# Patient Record
Sex: MALE | Race: WHITE | NOT HISPANIC OR LATINO | Employment: OTHER | URBAN - METROPOLITAN AREA
[De-identification: names, ages, dates, MRNs, and addresses within clinical notes are randomized per-mention and may not be internally consistent; named-entity substitution may affect disease eponyms.]

---

## 2023-09-06 LAB
APPEARANCE, URINE: CLEAR
BILIRUBIN, URINE: NEGATIVE
BLOOD, URINE: NEGATIVE
COLOR, URINE: YELLOW
GLUCOSE, URINE: NEGATIVE MG/DL
KETONES, URINE: NEGATIVE MG/DL
LEUKOCYTE ESTERASE, URINE: NEGATIVE
NITRITE, URINE: NEGATIVE
PH, URINE: 5 (ref 5–8)
PROSTATE SPECIFIC AG (NG/ML) IN SER/PLAS: 0.2 NG/ML (ref 0–4)
PROTEIN, URINE: NEGATIVE MG/DL
SPECIFIC GRAVITY, URINE: 1.02 (ref 1–1.03)
UROBILINOGEN, URINE: <2 MG/DL (ref 0–1.9)

## 2023-09-07 LAB — URINE CULTURE: NORMAL

## 2024-01-08 ENCOUNTER — PROCEDURE VISIT (OUTPATIENT)
Dept: UROLOGY | Facility: CLINIC | Age: 64
End: 2024-01-08
Payer: MEDICARE

## 2024-01-08 VITALS
SYSTOLIC BLOOD PRESSURE: 135 MMHG | BODY MASS INDEX: 41.21 KG/M2 | DIASTOLIC BLOOD PRESSURE: 78 MMHG | WEIGHT: 271 LBS | RESPIRATION RATE: 16 BRPM

## 2024-01-08 DIAGNOSIS — N35.014 POST-TRAUMATIC MALE URETHRAL STRICTURE: ICD-10-CM

## 2024-01-08 DIAGNOSIS — R31.0 GROSS HEMATURIA: Primary | ICD-10-CM

## 2024-01-08 PROCEDURE — 52281 CYSTOSCOPY AND TREATMENT: CPT | Performed by: UROLOGY

## 2024-01-08 NOTE — PROGRESS NOTES
"Patient ID: Armando Smith is a 63 y.o. male.    Procedures  Pt took macrodantin 50mg po as prescribed  Anesthesia: Local 2% Lidocaine  Instruments: 6F flexible disposable cystoscope  Pt brought to procedure room and placed in supine lithotomy position. Pt draped and prepped in normal sterile fashion. 10ml lidocaine instilled into urethral meatus. Pt tolerated well    I was present as chaperone for the entirety of the procedure Lisa Dunaway  Cystoscopy performed by Dr. Mynor Anderson        Bedside \"Time Out\" Verification   Today's Date: 01/08/2024. I attest that this time out verification took place prior to the procedure.   Procedure: cysto/dil    RN/LPN/MA:OMEGA   Provider: WAL.   Verified By: RN/LPN/MA, ARMANDOJAYNE SMITH and Provider.   Prior to the start of the procedure a time out was taken and the following were verified: the identity of the patient using two patient identifiers, the correct procedure, the correct site marked as indicated, the correct positioning for the patient and the correct equipment was obtained.   Cystoscopy - male ARMANDO SMITH identified using two (2) forms of identification.   Procedure: diagnostic cystourethroscopy.  Procedure Note: Time Started: 10:30am. Time Completed: 10:57 AM    Indications for procedure: irritable voiding symptoms.   Discussed with patient: Risks, benefits, and alternative were discussed in detail. Patient appears to understand and agrees to proceed. Patient has signed the procedure consent form.    CYSTOSCOPY:    Cystoscopy today once again reveals 8 separate distal urethral strictures beginning 2 cm from the meatus with the last being 4 cm distal from the external sphincter.  All dilated to 24 Paraguayan with minimal discomfort and minimal bleeding.  Once inside the bladder, no evidence of stones, lesions or tumors.  "

## 2024-01-08 NOTE — PROGRESS NOTES
"  Provider Impressions     63 year-old white male with his wife Marj. Originally presented with URINARY INCONTINENCE, GROSS HEMATURIA, URETHRAL STRICTURE, and BIPOLAR DISORDER. He was experiencing 3 UTIs per year. He receives mandy-intercourse ciprofloxacin. No family history of prostate cancer. Never smoked cigarettes.     2008 at [facility], a Askew CATHETER was placed. The patient claims that that \"started my problem\". He is now undergoing cystoscopy with urethral dilatation on a routine schedule.     10/01/12, PSA 0.5.     11/12/13, PSA 0.4, CYSTOSCOPY with URETHRAL DILATATION to performed.     04/08/15 PSA 0.4, all urine testing negative. CYSTOSCOPY with URETHRAL DILATATION to 24 Latvian. Patient states that 9 months was too long and that he was DRIBBLING and had poor stream over the last month. We will therefore change his schedule to 8 months and keep it at that range.     12/08/15, very significant DISTAL URETHRAL STRICTURE approximately 6 cm from the meatus. Measured and calibrated at 6 Latvian. This was carefully dilated with bleeding. Urine studies were normal and PSA is 0.2. We will transformed to a new schedule. He will have dilations every 6 months, March and September. Office visit in September with UA, C&S, and PSA.     03/19/16, successful cystoscopy with URETHRAL DILATATION. Multiple, 8-10, STRICTURES in the distal URETHRA DILATED to 24 Latvian with bleeding. We will maintain a 6 month schedule.     10/05/16, successful CYSTOSCOPY with URETHRAL DILATATION of 9 separate distal URETHRAL STRICTURES. There was some bleeding and discomfort. Urinalysis and urine culture negative. PSA is 0.2. We will maintain a 6 month schedule.     03/08/17, successful CYSTOSCOPY with URETHRAL DILATATION of 6 separate distal URETHRAL STRICTURES. There was moderate discomfort. Patient had a recent UTI which was resistant to Cipro. Therefore we will switch his prophylactic antibiotic to Macrobid. He will return in 6 months.   "   09/12/17, successful CYSTOSCOPY with URETHRAL DILATATION of of 6 separate distal URETHRAL STRICTURES, beginning at the meatus, to the external sphincter. There was mild discomfort. We will continue on his mandy-intercourse Macrobid. He did not obtain laboratory testing and will return soon for that. He will maintain a 6 month dilation schedule.     10/10/17, patient returns for office visit only. PSA 0.3, urinalysis negative, urine culture mixed. His wife Marj is with him today. He will return on 03/13/18 for dilation.     03/13/18, successful CYSTOSCOPY with URETHRAL DILATATION of of 6 separate distal URETHRAL STRICTURES, beginning 2 cm from the meatus, to the external sphincter. There was mild discomfort. We will continue on his mandy-intercourse Macrobid. we will maintain a 6 month schedule. He will return in September and that will also be his yearly visit.     09/11/18, successful CYSTOSCOPY with URETHRAL DILATATION of 8 separate distal URETHRAL STRICTURES, beginning 2 cm from the meatus. These extend to the EXTERNAL SPHINCTER. They were extremely dense and very difficult to dilate today. There was moderate discomfort. We will transition to a 4 month schedule instead of 6. He will continue with mandy-intercourse Macrobid. Urinalysis and urine culture are normal. PSA is 1.86.     01/08/19, successful CYSTOSCOPY WITH URETHRAL DILATATION once again of 8 SEPARATE DISTAL URETHRAL STRICTURES beginning 2 cm from the meatus and ending 4 cm from the external sphincter. Minimal pain and moderate bleeding. I believe the 4 month schedule is at the threshold and we will maintain this regimen.     05/08/19, successful CYSTOSCOPY WITH URETHRAL DILATATION of 8 separate distal u caps that s beginning 2 cm from the meatus and ending 2 cm from the EXTERNAL SPHINCTER. Minimal pain and moderate bleeding. Patient states that the 4 month schedule is dramatically better, adding that he only notices minimal discomfort with dilation  and his stream is only mildly slower the last month. We will continue to maintain that regimen.     09/18/19, successful CYSTOSCOPY WITH URETHRAL DILATATION of 8 separate distal URETHRAL STRICTURES beginning 2 cm from the meatus and ending 2 cm from the external sphincter. Minimal pain and moderate bleeding. Patient states that the 4 month schedule is perfect. He is starting to notice decreased flow stream and the discomfort with dilatation is only minimal. We will maintain that regimen. Urinalysis shows no blood, urine culture no growth, and PSA 0.2.     01/08/20, successful CYSTOSCOPY WITH URETHRAL DILATATION OF 12 SEPARATE DISTAL URETHRAL STRICTURES beginning 2 cm from the meatus and ending 2 cm from the external sphincter to 24 Somali. Minimal pain and minimal bleeding. He is very happy with the 4 month schedule. He also was noticed that he has not had a UTI for several years. We will maintain this regimen.     05/22/20, successful CYSTOSCOPY WITH URETHRAL DILATATION of 12 separate DISTAL URETHRAL STRICTURES beginning 2 cm from the meatus and ending 2 cm from the external sphincter to 24 Somali with minimal pain. He is very pleased with this 4 month schedule.     09/25/20, successful CYSTOSCOPY WITH URETHRAL DILATATION of 12 distinct DISTAL URETHRAL STRICTURES beginning at 2 cm from the meatus and ending 2 cm from the external sphincter, to 24 Somali with minimal pain and no bleeding. Urinalysis and urine culture are negative. PSA is 0.20. We will maintain a 4 month routine.     March 1, 2021, successful CYSTOSCOPY WITH URETHRAL DILATATION OF 10 DISTINCT DISTAL URETHRAL STRICTURES beginning 2 cm proximal to the meatus and ending 2 cm distal from the external sphincter. These were dilated to 24 Somali with moderate pain and no bleeding. Patient is late for his routine dilation and the strictures were quite dense. We will have him return in May and then resume his 4-month schedule.     May 8, 2021, successful  cystoscopy with urethral dilatation of 10 separate distal urethral strictures. The first beginning 2 cm proximal to the meatus and the last being 2 cm distal from the external sphincter. They were dilated to 24 Maldivian with minimal pain and minimal bleeding. He will return in September and maintain a 4-month regimen.  He is not interested in definitive surgical repair.     September 3, 2021, successful cystoscopy with dilatation of 10 separate distal urethral strictures. The first is encountered 2 cm from the meatus and the last being 2 cm distal from the external sphincter. All dilated to 24 Maldivian with minimal bleeding. Patient will return in January and maintain a 4-month regimen. Once again we discussed a definitive surgical repair which he refuses. Urinalysis is negative, urine culture no growth, PSA 0.20.     January 7, 2022, successful cystoscopy with dilatation of 10 separate distal urethral strictures beginning 2 cm from the meatus and ending 2 cm distal to the external sphincter. All dilated to 24 Maldivian with moderate pain and minimal bleeding. Patient states that over the last 2 weeks he has been noticing a decreased flow of stream. He will return in May and we will maintain his 4-month schedule.     May 4, 2022, successful cystoscopy with dilatation of 9 separate distal urethral strictures beginning 2 cm from the meatus and ending 3 cm distal to the external sphincter. They were all dilated to 24 Maldivian with minimal pain and minimal bleeding. He has been straining to urinate over the last week. We will maintain his 4-month schedule and see him again in September September 12, 2022, cystoscopy with dilatation of 9 separate distal urethral strictures beginning 4 cm from the meatus and ending 2 cm from the external sphincter. Quite dense today. Dilated to 24 Maldivian. Minimal pain and moderate bleeding. The bladder itself appears normal. Urine analysis was negative for blood. Urine culture no growth. PSA  0.21. Patient believed by symptoms that he had a UTI, no culture given. He self treated with on hand Macrobid with good results. We will maintain his 4-month schedule.     January 3, 2023, cystoscopy with dilatation of 8 separate distal urethral strictures beginning 2 cm from the meatus and ending 4 cm from the external sphincter. These were dilated to 24 Thai with minimal bleeding and minimal discomfort. Moderately obstructed prostatic urethra with a trabeculated bladder. We will maintain his 4-month schedule.     April 28, 2023, cystoscopy with dilatation of 8 separate distal urethral strictures beginning 2 cm from the meatus with the last stricture being 4 cm from the external sphincter. Dilated to 24 Thai with moderate discomfort and no bleeding. Moderately obstructed prostatic urethra and a trabeculated bladder with debris. We will maintain the 4-month schedule.     September 11, 2023, cystoscopy with dilatation of 8 separate distal urethral strictures. These begin 2 cm from the meatus and and 4 cm from the external sphincter. Dilation to 24 Thai with minimal discomfort and minimal bleeding. Moderately obstructed prostatic urethra. Trabeculated bladder with minimal debris. He wishes to maintain his 4-month schedule. Urinalysis is negative for blood. Urine culture no growth. PSA 0.20.    January 8, 2024, cystoscopy with dilatation of 8 separate distal urethral strictures. These begin 2 cm from the meatus and end 4 cm from the external sphincter. Dilation to 24 Thai with minimal discomfort and minimal bleeding. Moderately obstructed prostatic urethra. Trabeculated bladder with minimal debris. He wishes to maintain his 4-month schedule.     PLAN:     #1 the patient will return in January, May and September for cystoscopy with urethral dilatation to 24 Thai. MACROBID 100 mg PROPHYLAXIS, Begin 3 days PRIOR. We will maintain a 4 month schedule,     #2 UA, C&S, PSA and office visit in September only.  MAINTAIN a 4 month schedule.     #3 Macrobid 100 mg by mouth every 12 hours mandy-intercourse when necessary.    Physical Exam  Constitutional:       Appearance: Normal appearance.   HENT:      Head: Normocephalic and atraumatic.   Pulmonary:      Effort: Pulmonary effort is normal.   Abdominal:      Palpations: Abdomen is soft.      Tenderness: There is no abdominal tenderness.   Genitourinary:     Penis: Normal.       Testes: Normal.   Musculoskeletal:         General: Normal range of motion.      Cervical back: Normal range of motion and neck supple.   Neurological:      General: No focal deficit present.      Mental Status: He is alert and oriented to person, place, and time.   Psychiatric:         Mood and Affect: Mood normal.         Behavior: Behavior normal.         This note was created with voice-recognition software and was not corrected for typographical or grammatical errors

## 2024-01-08 NOTE — LETTER
"January 8, 2024     Telly Marques [place]  [id number] [address] [facility] [address]  [facility] [address]    Patient: Armando Smith   YOB: 1960   Date of Visit: 1/8/2024       Dear Dr. Telly Marques MD:    Thank you for referring Armando Smith to me for evaluation. Below are my notes for this consultation.  If you have questions, please do not hesitate to call me. I look forward to following your patient along with you.       Sincerely,     Mynor Anderson MD      CC: No Recipients  ______________________________________________________________________________________      Provider Impressions     63 year-old white male with his wife Marj. Originally presented with URINARY INCONTINENCE, GROSS HEMATURIA, URETHRAL STRICTURE, and BIPOLAR DISORDER. He was experiencing 3 UTIs per year. He receives mandy-intercourse ciprofloxacin. No family history of prostate cancer. Never smoked cigarettes.     2008 at [facility], a Askew CATHETER was placed. The patient claims that that \"started my problem\". He is now undergoing cystoscopy with urethral dilatation on a routine schedule.     10/01/12, PSA 0.5.     11/12/13, PSA 0.4, CYSTOSCOPY with URETHRAL DILATATION to performed.     04/08/15 PSA 0.4, all urine testing negative. CYSTOSCOPY with URETHRAL DILATATION to 24 Iraqi. Patient states that 9 months was too long and that he was DRIBBLING and had poor stream over the last month. We will therefore change his schedule to 8 months and keep it at that range.     12/08/15, very significant DISTAL URETHRAL STRICTURE approximately 6 cm from the meatus. Measured and calibrated at 6 Iraqi. This was carefully dilated with bleeding. Urine studies were normal and PSA is 0.2. We will transformed to a new schedule. He will have dilations every 6 months, March and September. Office visit in September with UA, C&S, and PSA.     03/19/16, successful cystoscopy with URETHRAL DILATATION. Multiple, 8-10, STRICTURES in the distal " URETHRA DILATED to 24 Faroese with bleeding. We will maintain a 6 month schedule.     10/05/16, successful CYSTOSCOPY with URETHRAL DILATATION of 9 separate distal URETHRAL STRICTURES. There was some bleeding and discomfort. Urinalysis and urine culture negative. PSA is 0.2. We will maintain a 6 month schedule.     03/08/17, successful CYSTOSCOPY with URETHRAL DILATATION of 6 separate distal URETHRAL STRICTURES. There was moderate discomfort. Patient had a recent UTI which was resistant to Cipro. Therefore we will switch his prophylactic antibiotic to Macrobid. He will return in 6 months.     09/12/17, successful CYSTOSCOPY with URETHRAL DILATATION of of 6 separate distal URETHRAL STRICTURES, beginning at the meatus, to the external sphincter. There was mild discomfort. We will continue on his mandy-intercourse Macrobid. He did not obtain laboratory testing and will return soon for that. He will maintain a 6 month dilation schedule.     10/10/17, patient returns for office visit only. PSA 0.3, urinalysis negative, urine culture mixed. His wife Marj is with him today. He will return on 03/13/18 for dilation.     03/13/18, successful CYSTOSCOPY with URETHRAL DILATATION of of 6 separate distal URETHRAL STRICTURES, beginning 2 cm from the meatus, to the external sphincter. There was mild discomfort. We will continue on his mandy-intercourse Macrobid. we will maintain a 6 month schedule. He will return in September and that will also be his yearly visit.     09/11/18, successful CYSTOSCOPY with URETHRAL DILATATION of 8 separate distal URETHRAL STRICTURES, beginning 2 cm from the meatus. These extend to the EXTERNAL SPHINCTER. They were extremely dense and very difficult to dilate today. There was moderate discomfort. We will transition to a 4 month schedule instead of 6. He will continue with mandy-intercourse Macrobid. Urinalysis and urine culture are normal. PSA is 1.86.     01/08/19, successful CYSTOSCOPY WITH URETHRAL  DILATATION once again of 8 SEPARATE DISTAL URETHRAL STRICTURES beginning 2 cm from the meatus and ending 4 cm from the external sphincter. Minimal pain and moderate bleeding. I believe the 4 month schedule is at the threshold and we will maintain this regimen.     05/08/19, successful CYSTOSCOPY WITH URETHRAL DILATATION of 8 separate distal u caps that s beginning 2 cm from the meatus and ending 2 cm from the EXTERNAL SPHINCTER. Minimal pain and moderate bleeding. Patient states that the 4 month schedule is dramatically better, adding that he only notices minimal discomfort with dilation and his stream is only mildly slower the last month. We will continue to maintain that regimen.     09/18/19, successful CYSTOSCOPY WITH URETHRAL DILATATION of 8 separate distal URETHRAL STRICTURES beginning 2 cm from the meatus and ending 2 cm from the external sphincter. Minimal pain and moderate bleeding. Patient states that the 4 month schedule is perfect. He is starting to notice decreased flow stream and the discomfort with dilatation is only minimal. We will maintain that regimen. Urinalysis shows no blood, urine culture no growth, and PSA 0.2.     01/08/20, successful CYSTOSCOPY WITH URETHRAL DILATATION OF 12 SEPARATE DISTAL URETHRAL STRICTURES beginning 2 cm from the meatus and ending 2 cm from the external sphincter to 24 Georgian. Minimal pain and minimal bleeding. He is very happy with the 4 month schedule. He also was noticed that he has not had a UTI for several years. We will maintain this regimen.     05/22/20, successful CYSTOSCOPY WITH URETHRAL DILATATION of 12 separate DISTAL URETHRAL STRICTURES beginning 2 cm from the meatus and ending 2 cm from the external sphincter to 24 Georgian with minimal pain. He is very pleased with this 4 month schedule.     09/25/20, successful CYSTOSCOPY WITH URETHRAL DILATATION of 12 distinct DISTAL URETHRAL STRICTURES beginning at 2 cm from the meatus and ending 2 cm from the external  sphincter, to 24 Moroccan with minimal pain and no bleeding. Urinalysis and urine culture are negative. PSA is 0.20. We will maintain a 4 month routine.     March 1, 2021, successful CYSTOSCOPY WITH URETHRAL DILATATION OF 10 DISTINCT DISTAL URETHRAL STRICTURES beginning 2 cm proximal to the meatus and ending 2 cm distal from the external sphincter. These were dilated to 24 Moroccan with moderate pain and no bleeding. Patient is late for his routine dilation and the strictures were quite dense. We will have him return in May and then resume his 4-month schedule.     May 8, 2021, successful cystoscopy with urethral dilatation of 10 separate distal urethral strictures. The first beginning 2 cm proximal to the meatus and the last being 2 cm distal from the external sphincter. They were dilated to 24 Moroccan with minimal pain and minimal bleeding. He will return in September and maintain a 4-month regimen.  He is not interested in definitive surgical repair.     September 3, 2021, successful cystoscopy with dilatation of 10 separate distal urethral strictures. The first is encountered 2 cm from the meatus and the last being 2 cm distal from the external sphincter. All dilated to 24 Moroccan with minimal bleeding. Patient will return in January and maintain a 4-month regimen. Once again we discussed a definitive surgical repair which he refuses. Urinalysis is negative, urine culture no growth, PSA 0.20.     January 7, 2022, successful cystoscopy with dilatation of 10 separate distal urethral strictures beginning 2 cm from the meatus and ending 2 cm distal to the external sphincter. All dilated to 24 Moroccan with moderate pain and minimal bleeding. Patient states that over the last 2 weeks he has been noticing a decreased flow of stream. He will return in May and we will maintain his 4-month schedule.     May 4, 2022, successful cystoscopy with dilatation of 9 separate distal urethral strictures beginning 2 cm from the meatus and  ending 3 cm distal to the external sphincter. They were all dilated to 24 Sierra Leonean with minimal pain and minimal bleeding. He has been straining to urinate over the last week. We will maintain his 4-month schedule and see him again in September September 12, 2022, cystoscopy with dilatation of 9 separate distal urethral strictures beginning 4 cm from the meatus and ending 2 cm from the external sphincter. Quite dense today. Dilated to 24 Sierra Leonean. Minimal pain and moderate bleeding. The bladder itself appears normal. Urine analysis was negative for blood. Urine culture no growth. PSA 0.21. Patient believed by symptoms that he had a UTI, no culture given. He self treated with on hand Macrobid with good results. We will maintain his 4-month schedule.     January 3, 2023, cystoscopy with dilatation of 8 separate distal urethral strictures beginning 2 cm from the meatus and ending 4 cm from the external sphincter. These were dilated to 24 Sierra Leonean with minimal bleeding and minimal discomfort. Moderately obstructed prostatic urethra with a trabeculated bladder. We will maintain his 4-month schedule.     April 28, 2023, cystoscopy with dilatation of 8 separate distal urethral strictures beginning 2 cm from the meatus with the last stricture being 4 cm from the external sphincter. Dilated to 24 Sierra Leonean with moderate discomfort and no bleeding. Moderately obstructed prostatic urethra and a trabeculated bladder with debris. We will maintain the 4-month schedule.     September 11, 2023, cystoscopy with dilatation of 8 separate distal urethral strictures. These begin 2 cm from the meatus and and 4 cm from the external sphincter. Dilation to 24 Sierra Leonean with minimal discomfort and minimal bleeding. Moderately obstructed prostatic urethra. Trabeculated bladder with minimal debris. He wishes to maintain his 4-month schedule. Urinalysis is negative for blood. Urine culture no growth. PSA 0.20.    January 8, 2024, cystoscopy with  dilatation of 8 separate distal urethral strictures. These begin 2 cm from the meatus and end 4 cm from the external sphincter. Dilation to 24 Samoan with minimal discomfort and minimal bleeding. Moderately obstructed prostatic urethra. Trabeculated bladder with minimal debris. He wishes to maintain his 4-month schedule.     PLAN:     #1 the patient will return in January, May and September for cystoscopy with urethral dilatation to 24 Samoan. MACROBID 100 mg PROPHYLAXIS, Begin 3 days PRIOR. We will maintain a 4 month schedule,     #2 UA, C&S, PSA and office visit in September only. MAINTAIN a 4 month schedule.     #3 Macrobid 100 mg by mouth every 12 hours mandy-intercourse when necessary.    Physical Exam  Constitutional:       Appearance: Normal appearance.   HENT:      Head: Normocephalic and atraumatic.   Pulmonary:      Effort: Pulmonary effort is normal.   Abdominal:      Palpations: Abdomen is soft.      Tenderness: There is no abdominal tenderness.   Genitourinary:     Penis: Normal.       Testes: Normal.   Musculoskeletal:         General: Normal range of motion.      Cervical back: Normal range of motion and neck supple.   Neurological:      General: No focal deficit present.      Mental Status: He is alert and oriented to person, place, and time.   Psychiatric:         Mood and Affect: Mood normal.         Behavior: Behavior normal.         This note was created with voice-recognition software and was not corrected for typographical or grammatical errors

## 2024-01-08 NOTE — PATIENT INSTRUCTIONS
Patient Discussion/Summary     Good to see you once again. You had a successful cystoscopy with urethral dilatation of 8 strictures. The strictures were quite dense today and you wish to maintain a 4 month schedule.       This note was created with voice-recognition software and was not corrected for typographical or grammatical errors.

## 2024-05-15 RX ORDER — NITROFURANTOIN 25; 75 MG/1; MG/1
100 CAPSULE ORAL EVERY 12 HOURS
Qty: 10 CAPSULE | Refills: 0 | Status: SHIPPED | OUTPATIENT
Start: 2024-05-15 | End: 2024-05-20

## 2024-05-20 ENCOUNTER — PROCEDURE VISIT (OUTPATIENT)
Dept: UROLOGY | Facility: CLINIC | Age: 64
End: 2024-05-20
Payer: MEDICARE

## 2024-05-20 VITALS
SYSTOLIC BLOOD PRESSURE: 155 MMHG | RESPIRATION RATE: 16 BRPM | BODY MASS INDEX: 40.46 KG/M2 | HEART RATE: 60 BPM | DIASTOLIC BLOOD PRESSURE: 74 MMHG | WEIGHT: 266.1 LBS

## 2024-05-20 DIAGNOSIS — R31.0 GROSS HEMATURIA: ICD-10-CM

## 2024-05-20 DIAGNOSIS — N35.014 POST-TRAUMATIC MALE URETHRAL STRICTURE: ICD-10-CM

## 2024-05-20 DIAGNOSIS — N39.0 URINARY TRACT INFECTION WITHOUT HEMATURIA, SITE UNSPECIFIED: ICD-10-CM

## 2024-05-20 DIAGNOSIS — N35.014 POST-TRAUMATIC MALE URETHRAL STRICTURE: Primary | ICD-10-CM

## 2024-05-20 PROCEDURE — 52281 CYSTOSCOPY AND TREATMENT: CPT | Performed by: UROLOGY

## 2024-05-20 NOTE — PROGRESS NOTES
"  Provider Impressions     63 year-old white male with his wife Marj. Originally presented with URINARY INCONTINENCE, GROSS HEMATURIA, URETHRAL STRICTURE, and BIPOLAR DISORDER. He was experiencing 3 UTIs per year. He receives mandy-intercourse ciprofloxacin. No family history of prostate cancer. Never smoked cigarettes.     2008 at [facility], a [place]kew CATHETER was placed. The patient claims that that \"started my problem\". He is now undergoing cystoscopy with urethral dilatation on a routine schedule.     10/01/12, PSA 0.5.     11/12/13, PSA 0.4, cystoscopy with urethral dilatation performed.     04/08/15 PSA 0.4, all urine testing negative. CYSTOSCOPY with URETHRAL DILATATION to 24 Belarusian. Patient states that 9 months was too long and that he was DRIBBLING and had poor stream over the last month. We will therefore change his schedule to 8 months and keep it at that range.     12/08/15, very significant DISTAL URETHRAL STRICTURE approximately 6 cm from the meatus. Measured and calibrated at 6 Belarusian. This was carefully dilated with bleeding. Urine studies were normal and PSA is 0.2. We will transformed to a new schedule. He will have dilations every 6 months, March and September. Office visit in September with UA, C&S, and PSA.     03/19/16, successful cystoscopy with URETHRAL DILATATION. Multiple, 8-10, STRICTURES in the distal URETHRA DILATED to 24 Belarusian with bleeding. We will maintain a 6 month schedule.     10/05/16, successful CYSTOSCOPY with URETHRAL DILATATION of 9 separate distal URETHRAL STRICTURES. There was some bleeding and discomfort. Urinalysis and urine culture negative. PSA is 0.2. We will maintain a 6 month schedule.     03/08/17, successful CYSTOSCOPY with URETHRAL DILATATION of 6 separate distal URETHRAL STRICTURES. There was moderate discomfort. Patient had a recent UTI which was resistant to Cipro. Therefore we will switch his prophylactic antibiotic to Macrobid. He will return in 6 months.   "   09/12/17, successful CYSTOSCOPY with URETHRAL DILATATION of of 6 separate distal URETHRAL STRICTURES, beginning at the meatus, to the external sphincter. There was mild discomfort. We will continue on his mandy-intercourse Macrobid. He did not obtain laboratory testing and will return soon for that. He will maintain a 6 month dilation schedule.     10/10/17, patient returns for office visit only. PSA 0.3, urinalysis negative, urine culture mixed. His wife Marj is with him today. He will return on 03/13/18 for dilation.     03/13/18, successful CYSTOSCOPY with URETHRAL DILATATION of of 6 separate distal URETHRAL STRICTURES, beginning 2 cm from the meatus, to the external sphincter. There was mild discomfort. We will continue on his mandy-intercourse Macrobid. we will maintain a 6 month schedule. He will return in September and that will also be his yearly visit.     09/11/18, successful CYSTOSCOPY with URETHRAL DILATATION of 8 separate distal URETHRAL STRICTURES, beginning 2 cm from the meatus. These extend to the EXTERNAL SPHINCTER. They were extremely dense and very difficult to dilate today. There was moderate discomfort. We will transition to a 4 month schedule instead of 6. He will continue with mandy-intercourse Macrobid. Urinalysis and urine culture are normal. PSA is 1.86.     01/08/19, successful CYSTOSCOPY WITH URETHRAL DILATATION once again of 8 SEPARATE DISTAL URETHRAL STRICTURES beginning 2 cm from the meatus and ending 4 cm from the external sphincter. Minimal pain and moderate bleeding. I believe the 4 month schedule is at the threshold and we will maintain this regimen.     05/08/19, successful CYSTOSCOPY WITH URETHRAL DILATATION of 8 separate distal u caps that s beginning 2 cm from the meatus and ending 2 cm from the EXTERNAL SPHINCTER. Minimal pain and moderate bleeding. Patient states that the 4 month schedule is dramatically better, adding that he only notices minimal discomfort with dilation  and his stream is only mildly slower the last month. We will continue to maintain that regimen.     09/18/19, successful CYSTOSCOPY WITH URETHRAL DILATATION of 8 separate distal URETHRAL STRICTURES beginning 2 cm from the meatus and ending 2 cm from the external sphincter. Minimal pain and moderate bleeding. Patient states that the 4 month schedule is perfect. He is starting to notice decreased flow stream and the discomfort with dilatation is only minimal. We will maintain that regimen. Urinalysis shows no blood, urine culture no growth, and PSA 0.2.     01/08/20, successful CYSTOSCOPY WITH URETHRAL DILATATION OF 12 SEPARATE DISTAL URETHRAL STRICTURES beginning 2 cm from the meatus and ending 2 cm from the external sphincter to 24 Cypriot. Minimal pain and minimal bleeding. He is very happy with the 4 month schedule. He also was noticed that he has not had a UTI for several years. We will maintain this regimen.     05/22/20, successful CYSTOSCOPY WITH URETHRAL DILATATION of 12 separate DISTAL URETHRAL STRICTURES beginning 2 cm from the meatus and ending 2 cm from the external sphincter to 24 Cypriot with minimal pain. He is very pleased with this 4 month schedule.     09/25/20, successful CYSTOSCOPY WITH URETHRAL DILATATION of 12 distinct DISTAL URETHRAL STRICTURES beginning at 2 cm from the meatus and ending 2 cm from the external sphincter, to 24 Cypriot with minimal pain and no bleeding. Urinalysis and urine culture are negative. PSA is 0.20. We will maintain a 4 month routine.     March 1, 2021, successful CYSTOSCOPY WITH URETHRAL DILATATION OF 10 DISTINCT DISTAL URETHRAL STRICTURES beginning 2 cm proximal to the meatus and ending 2 cm distal from the external sphincter. These were dilated to 24 Cypriot with moderate pain and no bleeding. Patient is late for his routine dilation and the strictures were quite dense. We will have him return in May and then resume his 4-month schedule.     May 8, 2021, successful  cystoscopy with urethral dilatation of 10 separate distal urethral strictures. The first beginning 2 cm proximal to the meatus and the last being 2 cm distal from the external sphincter. They were dilated to 24 Albanian with minimal pain and minimal bleeding. He will return in September and maintain a 4-month regimen.  He is not interested in definitive surgical repair.     September 3, 2021, successful cystoscopy with dilatation of 10 separate distal urethral strictures. The first is encountered 2 cm from the meatus and the last being 2 cm distal from the external sphincter. All dilated to 24 Albanian with minimal bleeding. Patient will return in January and maintain a 4-month regimen. Once again we discussed a definitive surgical repair which he refuses. Urinalysis is negative, urine culture no growth, PSA 0.20.     January 7, 2022, successful cystoscopy with dilatation of 10 separate distal urethral strictures beginning 2 cm from the meatus and ending 2 cm distal to the external sphincter. All dilated to 24 Albanian with moderate pain and minimal bleeding. Patient states that over the last 2 weeks he has been noticing a decreased flow of stream. He will return in May and we will maintain his 4-month schedule.     May 4, 2022, successful cystoscopy with dilatation of 9 separate distal urethral strictures beginning 2 cm from the meatus and ending 3 cm distal to the external sphincter. They were all dilated to 24 Albanian with minimal pain and minimal bleeding. He has been straining to urinate over the last week. We will maintain his 4-month schedule and see him again in September September 12, 2022, cystoscopy with dilatation of 9 separate distal urethral strictures beginning 4 cm from the meatus and ending 2 cm from the external sphincter. Quite dense today. Dilated to 24 Albanian. Minimal pain and moderate bleeding. The bladder itself appears normal. Urine analysis was negative for blood. Urine culture no growth. PSA  0.21. Patient believed by symptoms that he had a UTI, no culture given. He self treated with on hand Macrobid with good results. We will maintain his 4-month schedule.     January 3, 2023, cystoscopy with dilatation of 8 separate distal urethral strictures beginning 2 cm from the meatus and ending 4 cm from the external sphincter. These were dilated to 24 Emirati with minimal bleeding and minimal discomfort. Moderately obstructed prostatic urethra with a trabeculated bladder. We will maintain his 4-month schedule.     April 28, 2023, cystoscopy with dilatation of 8 separate distal urethral strictures beginning 2 cm from the meatus with the last stricture being 4 cm from the external sphincter. Dilated to 24 Emirati with moderate discomfort and no bleeding. Moderately obstructed prostatic urethra and a trabeculated bladder with debris. We will maintain the 4-month schedule.     September 11, 2023, cystoscopy with dilatation of 8 separate distal urethral strictures. These begin 2 cm from the meatus and and 4 cm from the external sphincter. Dilation to 24 Emirati with minimal discomfort and minimal bleeding. Moderately obstructed prostatic urethra. Trabeculated bladder with minimal debris. He wishes to maintain his 4-month schedule. Urinalysis is negative for blood. Urine culture no growth. PSA 0.20.     January 8, 2024, cystoscopy with dilatation of 8 separate distal urethral strictures. These begin 2 cm from the meatus and end 4 cm from the external sphincter. Dilation to 24 Emirati with minimal discomfort and minimal bleeding. Moderately obstructed prostatic urethra. Trabeculated bladder with minimal debris. He wishes to maintain his 4-month schedule.    May 20, 2024, cystoscopy with dilatation of 8 separate, distal urethral strictures.  The first is 2 cm from the meatus and the last which was quite dense was 4 cm from the external sphincter.  All dilated to 24 Emirati with minimal bleeding and minimal discomfort.   Moderately obstructed prostatic urethra.  He will maintain his 4-month schedule.     PLAN:     #1 the patient will return in January, May and September for cystoscopy with urethral dilatation to 24 Maltese. MACROBID 100 mg PROPHYLAXIS, Begin 3 days PRIOR. We will maintain a 4 month schedule,     #2 UA, C&S, PSA and office visit in September only. MAINTAIN a 4 month schedule.     #3 Macrobid 100 mg by mouth every 12 hours mandy-intercourse when necessary.     Physical Exam  Constitutional:       Appearance: Normal appearance.   HENT:      Head: Normocephalic and atraumatic.   Pulmonary:      Effort: Pulmonary effort is normal.   Abdominal:      Palpations: Abdomen is soft.      Tenderness: There is no abdominal tenderness.   Genitourinary:     Penis: Normal.       Testes: Normal.   Musculoskeletal:         General: Normal range of motion.      Cervical back: Normal range of motion and neck supple.   Neurological:      General: No focal deficit present.      Mental Status: He is alert and oriented to person, place, and time.   Psychiatric:         Mood and Affect: Mood normal.         Behavior: Behavior normal.        This note was created with voice-recognition software and was not corrected for typographical or grammatical errors

## 2024-05-20 NOTE — PROGRESS NOTES
"Patient ID: Armando Smith is a 63 y.o. male.    Procedures  Pt took macrodantin 50mg po as prescribed  Anesthesia: Local 2% Lidocaine  Instruments: 6F flexible disposable cystoscope  Pt brought to procedure room and placed in supine lithotomy position. Pt draped and prepped in normal sterile fashion. 10ml lidocaine instilled into urethral meatus. Pt tolerated well    I was present as chaperone for the entirety of the procedure   Lisa Dunaway  Cystoscopy performed by Dr. Mynor Anderson  Bedside \"Time Out\" Verification   Today's Date: 5/20/2024 . I attest that this time out verification took place prior to the procedure.   Procedure: cysto/dil   RN/LPN/MA:OMEGA   Provider: [facility].   Verified By: RN/LPN/MA,  OMEGA  and Provider.   Prior to the start of the procedure a time out was taken and the following were verified: the identity of the patient using two patient identifiers, the correct procedure, the correct site marked as indicated, the correct positioning for the patient and the correct equipment was obtained.   Cystoscopy - male  ARMANDO SMITH      identified using two (2) forms of identification.   Procedure: diagnostic cystourethroscopy.  Procedure Note: Time Started: 10:30AM  Time Completed: 10:45 AM  Indications for procedure: irritable voiding symptoms.   Discussed with patient: Risks, benefits, and alternative were discussed in detail. Patient appears to understand and agrees to proceed. Patient has signed the procedure consent form.    CYSTOSCOPY:    Cystoscopy today reveals 8 separate dense urethral strictures.  The first 2 cm from the meatus and the last 4 cm from the external sphincter.  Moderately obstructed prostatic urethra.  These were dilated to 24 Citizen of [place] with minimal discomfort.    "

## 2024-05-20 NOTE — LETTER
"May 20, 2024     Telly Marques MD  [address] [facility] [address]  [facility] [address]    Patient: Armando Smith   YOB: 1960   Date of Visit: 5/20/2024       Dear Dr. Telly Marques MD:    Thank you for referring Armando Smith to me for evaluation. Below are my notes for this consultation.  If you have questions, please do not hesitate to call me. I look forward to following your patient along with you.       Sincerely,     Mynor Anderson MD      CC: No Recipients  ______________________________________________________________________________________      Provider Impressions     63 year-old white male with his wife Marj. Originally presented with URINARY INCONTINENCE, GROSS HEMATURIA, URETHRAL STRICTURE, and BIPOLAR DISORDER. He was experiencing 3 UTIs per year. He receives mandy-intercourse ciprofloxacin. No family history of prostate cancer. Never smoked cigarettes.     2008 at [facility], a Askew CATHETER was placed. The patient claims that that \"started my problem\". He is now undergoing cystoscopy with urethral dilatation on a routine schedule.     10/01/12, PSA 0.5.     11/12/13, PSA 0.4, cystoscopy with urethral dilatation performed.     04/08/15 PSA 0.4, all urine testing negative. CYSTOSCOPY with URETHRAL DILATATION to 24 Tanzanian. Patient states that 9 months was too long and that he was DRIBBLING and had poor stream over the last month. We will therefore change his schedule to 8 months and keep it at that range.     12/08/15, very significant DISTAL URETHRAL STRICTURE approximately 6 cm from the meatus. Measured and calibrated at 6 Tanzanian. This was carefully dilated with bleeding. Urine studies were normal and PSA is 0.2. We will transformed to a new schedule. He will have dilations every 6 months, March and September. Office visit in September with UA, C&S, and PSA.     03/19/16, successful cystoscopy with URETHRAL DILATATION. Multiple, 8-10, STRICTURES in the distal URETHRA " DILATED to 24 Grenadian with bleeding. We will maintain a 6 month schedule.     10/05/16, successful CYSTOSCOPY with URETHRAL DILATATION of 9 separate distal URETHRAL STRICTURES. There was some bleeding and discomfort. Urinalysis and urine culture negative. PSA is 0.2. We will maintain a 6 month schedule.     03/08/17, successful CYSTOSCOPY with URETHRAL DILATATION of 6 separate distal URETHRAL STRICTURES. There was moderate discomfort. Patient had a recent UTI which was resistant to Cipro. Therefore we will switch his prophylactic antibiotic to Macrobid. He will return in 6 months.     09/12/17, successful CYSTOSCOPY with URETHRAL DILATATION of of 6 separate distal URETHRAL STRICTURES, beginning at the meatus, to the external sphincter. There was mild discomfort. We will continue on his mandy-intercourse Macrobid. He did not obtain laboratory testing and will return soon for that. He will maintain a 6 month dilation schedule.     10/10/17, patient returns for office visit only. PSA 0.3, urinalysis negative, urine culture mixed. His wife Marj is with him today. He will return on 03/13/18 for dilation.     03/13/18, successful CYSTOSCOPY with URETHRAL DILATATION of of 6 separate distal URETHRAL STRICTURES, beginning 2 cm from the meatus, to the external sphincter. There was mild discomfort. We will continue on his mandy-intercourse Macrobid. we will maintain a 6 month schedule. He will return in September and that will also be his yearly visit.     09/11/18, successful CYSTOSCOPY with URETHRAL DILATATION of 8 separate distal URETHRAL STRICTURES, beginning 2 cm from the meatus. These extend to the EXTERNAL SPHINCTER. They were extremely dense and very difficult to dilate today. There was moderate discomfort. We will transition to a 4 month schedule instead of 6. He will continue with mandy-intercourse Macrobid. Urinalysis and urine culture are normal. PSA is 1.86.     01/08/19, successful CYSTOSCOPY WITH URETHRAL  DILATATION once again of 8 SEPARATE DISTAL URETHRAL STRICTURES beginning 2 cm from the meatus and ending 4 cm from the external sphincter. Minimal pain and moderate bleeding. I believe the 4 month schedule is at the threshold and we will maintain this regimen.     05/08/19, successful CYSTOSCOPY WITH URETHRAL DILATATION of 8 separate distal u caps that s beginning 2 cm from the meatus and ending 2 cm from the EXTERNAL SPHINCTER. Minimal pain and moderate bleeding. Patient states that the 4 month schedule is dramatically better, adding that he only notices minimal discomfort with dilation and his stream is only mildly slower the last month. We will continue to maintain that regimen.     09/18/19, successful CYSTOSCOPY WITH URETHRAL DILATATION of 8 separate distal URETHRAL STRICTURES beginning 2 cm from the meatus and ending 2 cm from the external sphincter. Minimal pain and moderate bleeding. Patient states that the 4 month schedule is perfect. He is starting to notice decreased flow stream and the discomfort with dilatation is only minimal. We will maintain that regimen. Urinalysis shows no blood, urine culture no growth, and PSA 0.2.     01/08/20, successful CYSTOSCOPY WITH URETHRAL DILATATION OF 12 SEPARATE DISTAL URETHRAL STRICTURES beginning 2 cm from the meatus and ending 2 cm from the external sphincter to 24 North Korean. Minimal pain and minimal bleeding. He is very happy with the 4 month schedule. He also was noticed that he has not had a UTI for several years. We will maintain this regimen.     05/22/20, successful CYSTOSCOPY WITH URETHRAL DILATATION of 12 separate DISTAL URETHRAL STRICTURES beginning 2 cm from the meatus and ending 2 cm from the external sphincter to 24 North Korean with minimal pain. He is very pleased with this 4 month schedule.     09/25/20, successful CYSTOSCOPY WITH URETHRAL DILATATION of 12 distinct DISTAL URETHRAL STRICTURES beginning at 2 cm from the meatus and ending 2 cm from the external  sphincter, to 24 Mongolian with minimal pain and no bleeding. Urinalysis and urine culture are negative. PSA is 0.20. We will maintain a 4 month routine.     March 1, 2021, successful CYSTOSCOPY WITH URETHRAL DILATATION OF 10 DISTINCT DISTAL URETHRAL STRICTURES beginning 2 cm proximal to the meatus and ending 2 cm distal from the external sphincter. These were dilated to 24 Mongolian with moderate pain and no bleeding. Patient is late for his routine dilation and the strictures were quite dense. We will have him return in May and then resume his 4-month schedule.     May 8, 2021, successful cystoscopy with urethral dilatation of 10 separate distal urethral strictures. The first beginning 2 cm proximal to the meatus and the last being 2 cm distal from the external sphincter. They were dilated to 24 Mongolian with minimal pain and minimal bleeding. He will return in September and maintain a 4-month regimen.  He is not interested in definitive surgical repair.     September 3, 2021, successful cystoscopy with dilatation of 10 separate distal urethral strictures. The first is encountered 2 cm from the meatus and the last being 2 cm distal from the external sphincter. All dilated to 24 Mongolian with minimal bleeding. Patient will return in January and maintain a 4-month regimen. Once again we discussed a definitive surgical repair which he refuses. Urinalysis is negative, urine culture no growth, PSA 0.20.     January 7, 2022, successful cystoscopy with dilatation of 10 separate distal urethral strictures beginning 2 cm from the meatus and ending 2 cm distal to the external sphincter. All dilated to 24 Mongolian with moderate pain and minimal bleeding. Patient states that over the last 2 weeks he has been noticing a decreased flow of stream. He will return in May and we will maintain his 4-month schedule.     May 4, 2022, successful cystoscopy with dilatation of 9 separate distal urethral strictures beginning 2 cm from the meatus and  ending 3 cm distal to the external sphincter. They were all dilated to 24 Israeli with minimal pain and minimal bleeding. He has been straining to urinate over the last week. We will maintain his 4-month schedule and see him again in September September 12, 2022, cystoscopy with dilatation of 9 separate distal urethral strictures beginning 4 cm from the meatus and ending 2 cm from the external sphincter. Quite dense today. Dilated to 24 Israeli. Minimal pain and moderate bleeding. The bladder itself appears normal. Urine analysis was negative for blood. Urine culture no growth. PSA 0.21. Patient believed by symptoms that he had a UTI, no culture given. He self treated with on hand Macrobid with good results. We will maintain his 4-month schedule.     January 3, 2023, cystoscopy with dilatation of 8 separate distal urethral strictures beginning 2 cm from the meatus and ending 4 cm from the external sphincter. These were dilated to 24 Israeli with minimal bleeding and minimal discomfort. Moderately obstructed prostatic urethra with a trabeculated bladder. We will maintain his 4-month schedule.     April 28, 2023, cystoscopy with dilatation of 8 separate distal urethral strictures beginning 2 cm from the meatus with the last stricture being 4 cm from the external sphincter. Dilated to 24 Israeli with moderate discomfort and no bleeding. Moderately obstructed prostatic urethra and a trabeculated bladder with debris. We will maintain the 4-month schedule.     September 11, 2023, cystoscopy with dilatation of 8 separate distal urethral strictures. These begin 2 cm from the meatus and and 4 cm from the external sphincter. Dilation to 24 Israeli with minimal discomfort and minimal bleeding. Moderately obstructed prostatic urethra. Trabeculated bladder with minimal debris. He wishes to maintain his 4-month schedule. Urinalysis is negative for blood. Urine culture no growth. PSA 0.20.     January 8, 2024, cystoscopy with  dilatation of 8 separate distal urethral strictures. These begin 2 cm from the meatus and end 4 cm from the external sphincter. Dilation to 24 Japanese with minimal discomfort and minimal bleeding. Moderately obstructed prostatic urethra. Trabeculated bladder with minimal debris. He wishes to maintain his 4-month schedule.    May 20, 2024, cystoscopy with dilatation of 8 separate, distal urethral strictures.  The first is 2 cm from the meatus and the last which was quite dense was 4 cm from the external sphincter.  All dilated to 24 Japanese with minimal bleeding and minimal discomfort.  Moderately obstructed prostatic urethra.  He will maintain his 4-month schedule.     PLAN:     #1 the patient will return in January, May and September for cystoscopy with urethral dilatation to 24 Japanese. MACROBID 100 mg PROPHYLAXIS, Begin 3 days PRIOR. We will maintain a 4 month schedule,     #2 UA, C&S, PSA and office visit in September only. MAINTAIN a 4 month schedule.     #3 Macrobid 100 mg by mouth every 12 hours mandy-intercourse when necessary.     Physical Exam  Constitutional:       Appearance: Normal appearance.   HENT:      Head: Normocephalic and atraumatic.   Pulmonary:      Effort: Pulmonary effort is normal.   Abdominal:      Palpations: Abdomen is soft.      Tenderness: There is no abdominal tenderness.   Genitourinary:     Penis: Normal.       Testes: Normal.   Musculoskeletal:         General: Normal range of motion.      Cervical back: Normal range of motion and neck supple.   Neurological:      General: No focal deficit present.      Mental Status: He is alert and oriented to person, place, and time.   Psychiatric:         Mood and Affect: Mood normal.         Behavior: Behavior normal.        This note was created with voice-recognition software and was not corrected for typographical or grammatical errors

## 2024-08-19 ENCOUNTER — LAB (OUTPATIENT)
Dept: LAB | Facility: LAB | Age: 64
End: 2024-08-19
Payer: MEDICARE

## 2024-08-19 DIAGNOSIS — R31.0 GROSS HEMATURIA: ICD-10-CM

## 2024-08-19 DIAGNOSIS — N35.014 POST-TRAUMATIC MALE URETHRAL STRICTURE: ICD-10-CM

## 2024-08-19 LAB
APPEARANCE UR: CLEAR
BILIRUB UR STRIP.AUTO-MCNC: NEGATIVE MG/DL
COLOR UR: YELLOW
GLUCOSE UR STRIP.AUTO-MCNC: NORMAL MG/DL
KETONES UR STRIP.AUTO-MCNC: NEGATIVE MG/DL
LEUKOCYTE ESTERASE UR QL STRIP.AUTO: NEGATIVE
NITRITE UR QL STRIP.AUTO: NEGATIVE
PH UR STRIP.AUTO: 5.5 [PH]
PROT UR STRIP.AUTO-MCNC: NEGATIVE MG/DL
PSA SERPL-MCNC: 0.26 NG/ML
RBC # UR STRIP.AUTO: NEGATIVE /UL
SP GR UR STRIP.AUTO: 1.02
UROBILINOGEN UR STRIP.AUTO-MCNC: NORMAL MG/DL

## 2024-08-19 PROCEDURE — 81003 URINALYSIS AUTO W/O SCOPE: CPT

## 2024-08-19 PROCEDURE — 87086 URINE CULTURE/COLONY COUNT: CPT

## 2024-08-19 PROCEDURE — 36415 COLL VENOUS BLD VENIPUNCTURE: CPT

## 2024-08-19 PROCEDURE — 84153 ASSAY OF PSA TOTAL: CPT

## 2024-08-20 LAB — BACTERIA UR CULT: NORMAL

## 2024-09-23 ENCOUNTER — APPOINTMENT (OUTPATIENT)
Dept: UROLOGY | Facility: CLINIC | Age: 64
End: 2024-09-23
Payer: MEDICARE

## 2024-10-08 RX ORDER — NITROFURANTOIN 25; 75 MG/1; MG/1
100 CAPSULE ORAL EVERY 12 HOURS
Qty: 10 CAPSULE | Refills: 0 | Status: SHIPPED | OUTPATIENT
Start: 2024-10-08 | End: 2024-10-13

## 2024-10-18 ENCOUNTER — APPOINTMENT (OUTPATIENT)
Dept: UROLOGY | Facility: CLINIC | Age: 64
End: 2024-10-18
Payer: MEDICARE

## 2024-10-18 VITALS
RESPIRATION RATE: 20 BRPM | HEART RATE: 63 BPM | HEIGHT: 68 IN | WEIGHT: 256 LBS | DIASTOLIC BLOOD PRESSURE: 70 MMHG | BODY MASS INDEX: 38.8 KG/M2 | SYSTOLIC BLOOD PRESSURE: 114 MMHG

## 2024-10-18 DIAGNOSIS — R31.9 HEMATURIA, UNSPECIFIED TYPE: ICD-10-CM

## 2024-10-18 DIAGNOSIS — N35.014 POST-TRAUMATIC MALE URETHRAL STRICTURE: Primary | ICD-10-CM

## 2024-10-18 DIAGNOSIS — R31.0 GROSS HEMATURIA: ICD-10-CM

## 2024-10-18 PROCEDURE — 99213 OFFICE O/P EST LOW 20 MIN: CPT | Performed by: UROLOGY

## 2024-10-18 PROCEDURE — 52281 CYSTOSCOPY AND TREATMENT: CPT | Performed by: UROLOGY

## 2024-10-18 NOTE — PATIENT INSTRUCTIONS
Patient Discussion/Summary     Good to see you once again. You had a successful cystoscopy with urethral dilatation of 8 strictures. The strictures were quite dense today and you wish to maintain a 4 month schedule.  Your urinalysis was negative for blood.  Urine culture showed no growth.  PSA is normal at 0.26.  Once again, we had a discussion regarding definitive treatment for your chronic urethral strictures.  You would like to consider urethral reconstruction and I will refer you to Dr. Tc Campo.      This note was created with voice-recognition software and was not corrected for typographical or grammatical errors.

## 2024-10-18 NOTE — PROGRESS NOTES
"Pt took macrodantin 50mg po as prescribed  Anesthesia: Local 2% Lidocaine  Instruments: 6F flexible disposable Cystoscope    Pt brought to procedure room and placed in dorsal lithotomy position. Pt draped and prepped in normal sterile fashion. 5ml lidocaine instilled into urethral meatus and 5ml instilled into urethra (Penis). Pt tolerated well.    I was present as chaperone for the entirety of the procedure   Idania Gibson  Cystoscopy performed by Dr. Mynor Anderson      Bedside \"Time Out\" Verification   Today's Date:  10/18/2024. I attest that this time out verification took place prior to the procedure.   Procedure: Cysto   RN/LPN/MA:    Provider: [facility].   Verified By: MA, Idania Gibson and Provider, Dr. Mynor Anderson.   Prior to the start of the procedure a time out was taken and the following were verified: the identity of the patient using two patient identifiers, the correct procedure, the correct site marked as indicated, the correct positioning for the patient and the correct equipment was obtained.   Cystoscopy - Armando Smith   identified using two (2) forms of identification.   Procedure: diagnostic Cystourethroscopy   Procedure Note: Time Started: 10:30am  Time Completed: 11:25 AM  Indications for procedure: Cysto/Dil 24F due to 8 separate, distal urethral strictures. The first is 2 cm from the meatus and the last which was quite dense was 4 cm from the external sphincter    CYSTOSCOPY:    Cystoscopy today once again identifies 8 separate distal urethral strictures.  The first is located 2 cm from the meatus and the last is 4 cm distal from the external sphincter.  These were all dilated to 24 Paraguayan with minimal discomfort and minimal bleeding.  Moderately obstructed prostatic urethra.  Once inside the bladder, no evidence of stones or tumors.  "

## 2024-10-18 NOTE — PROGRESS NOTES
"  Provider Impressions     64 year-old white male with his wife Marj. Originally presented with URINARY INCONTINENCE, GROSS HEMATURIA, URETHRAL STRICTURE, and BIPOLAR DISORDER. He was experiencing 3 UTIs per year. He receives mandy-intercourse ciprofloxacin. No family history of prostate cancer. Never smoked cigarettes.     2008 at [facility], a [place] CATHETER was placed. The patient claims that that \"started my problem\". He is now undergoing cystoscopy with urethral dilatation on a routine schedule.     10/01/12, PSA 0.5.     11/12/13, PSA 0.4, cystoscopy with urethral dilatation performed.     04/08/15 PSA 0.4, all urine testing negative. CYSTOSCOPY with URETHRAL DILATATION to 24 [place]. Patient states that 9 months was too long and that he was DRIBBLING and had poor stream over the last month. We will therefore change his schedule to 8 months and keep it at that range.     12/08/15, very significant DISTAL URETHRAL STRICTURE approximately 6 cm from the meatus. Measured and calibrated at 6 [place]. This was carefully dilated with bleeding. Urine studies were normal and PSA is 0.2. We will transformed to a new schedule. He will have dilations every 6 months, March and September. Office visit in September with UA, C&S, and PSA.     03/19/16, successful cystoscopy with URETHRAL DILATATION. Multiple, 8-10, STRICTURES in the distal URETHRA DILATED to 24 [place] with bleeding. We will maintain a 6 month schedule.     10/05/16, successful CYSTOSCOPY with URETHRAL DILATATION of 9 separate distal URETHRAL STRICTURES. There was some bleeding and discomfort. Urinalysis and urine culture negative. PSA is 0.2. We will maintain a 6 month schedule.     03/08/17, successful CYSTOSCOPY with URETHRAL DILATATION of 6 separate distal URETHRAL STRICTURES. There was moderate discomfort. Patient had a recent UTI which was resistant to Cipro. Therefore we will switch his prophylactic antibiotic to Macrobid. He will return in 6 months.   "   09/12/17, successful CYSTOSCOPY with URETHRAL DILATATION of of 6 separate distal URETHRAL STRICTURES, beginning at the meatus, to the external sphincter. There was mild discomfort. We will continue on his mandy-intercourse Macrobid. He did not obtain laboratory testing and will return soon for that. He will maintain a 6 month dilation schedule.     10/10/17, patient returns for office visit only. PSA 0.3, urinalysis negative, urine culture mixed. His wife Marj is with him today. He will return on 03/13/18 for dilation.     03/13/18, successful CYSTOSCOPY with URETHRAL DILATATION of of 6 separate distal URETHRAL STRICTURES, beginning 2 cm from the meatus, to the external sphincter. There was mild discomfort. We will continue on his mandy-intercourse Macrobid. we will maintain a 6 month schedule. He will return in September and that will also be his yearly visit.     09/11/18, successful CYSTOSCOPY with URETHRAL DILATATION of 8 separate distal URETHRAL STRICTURES, beginning 2 cm from the meatus. These extend to the EXTERNAL SPHINCTER. They were extremely dense and very difficult to dilate today. There was moderate discomfort. We will transition to a 4 month schedule instead of 6. He will continue with mandy-intercourse Macrobid. Urinalysis and urine culture are normal. PSA is 1.86.     01/08/19, successful CYSTOSCOPY WITH URETHRAL DILATATION once again of 8 SEPARATE DISTAL URETHRAL STRICTURES beginning 2 cm from the meatus and ending 4 cm from the external sphincter. Minimal pain and moderate bleeding. I believe the 4 month schedule is at the threshold and we will maintain this regimen.     05/08/19, successful CYSTOSCOPY WITH URETHRAL DILATATION of 8 separate distal u caps that s beginning 2 cm from the meatus and ending 2 cm from the EXTERNAL SPHINCTER. Minimal pain and moderate bleeding. Patient states that the 4 month schedule is dramatically better, adding that he only notices minimal discomfort with dilation  and his stream is only mildly slower the last month. We will continue to maintain that regimen.     09/18/19, successful CYSTOSCOPY WITH URETHRAL DILATATION of 8 separate distal URETHRAL STRICTURES beginning 2 cm from the meatus and ending 2 cm from the external sphincter. Minimal pain and moderate bleeding. Patient states that the 4 month schedule is perfect. He is starting to notice decreased flow stream and the discomfort with dilatation is only minimal. We will maintain that regimen. Urinalysis shows no blood, urine culture no growth, and PSA 0.2.     01/08/20, successful CYSTOSCOPY WITH URETHRAL DILATATION OF 12 SEPARATE DISTAL URETHRAL STRICTURES beginning 2 cm from the meatus and ending 2 cm from the external sphincter to 24 [place]ian. Minimal pain and minimal bleeding. He is very happy with the 4 month schedule. He also was noticed that he has not had a UTI for several years. We will maintain this regimen.     05/22/20, successful CYSTOSCOPY WITH URETHRAL DILATATION of 12 separate DISTAL URETHRAL STRICTURES beginning 2 cm from the meatus and ending 2 cm from the external sphincter to 24 [place] with minimal pain. He is very pleased with this 4 month schedule.     09/25/20, successful CYSTOSCOPY WITH URETHRAL DILATATION of 12 distinct DISTAL URETHRAL STRICTURES beginning at 2 cm from the meatus and ending 2 cm from the external sphincter, to 24 [place]ian with minimal pain and no bleeding. Urinalysis and urine culture are negative. PSA is 0.20. We will maintain a 4 month routine.     March 1, 2021, successful CYSTOSCOPY WITH URETHRAL DILATATION OF 10 DISTINCT DISTAL URETHRAL STRICTURES beginning 2 cm proximal to the meatus and ending 2 cm distal from the external sphincter. These were dilated to 24 [place]ian with moderate pain and no bleeding. Patient is late for his routine dilation and the strictures were quite dense. We will have him return in May and then resume his 4-month schedule.     May 8, 2021, successful  cystoscopy with urethral dilatation of 10 separate distal urethral strictures. The first beginning 2 cm proximal to the meatus and the last being 2 cm distal from the external sphincter. They were dilated to 24 [place]an with minimal pain and minimal bleeding. He will return in September and maintain a 4-month regimen.  He is not interested in definitive surgical repair.     September 3, 2021, successful cystoscopy with dilatation of 10 separate distal urethral strictures. The first is encountered 2 cm from the meatus and the last being 2 cm distal from the external sphincter. All dilated to 24 [place]an with minimal bleeding. Patient will return in January and maintain a 4-month regimen. Once again we discussed a definitive surgical repair which he refuses. Urinalysis is negative, urine culture no growth, PSA 0.20.     January 7, 2022, successful cystoscopy with dilatation of 10 separate distal urethral strictures beginning 2 cm from the meatus and ending 2 cm distal to the external sphincter. All dilated to 24 [place]uean with moderate pain and minimal bleeding. Patient states that over the last 2 weeks he has been noticing a decreased flow of stream. He will return in May and we will maintain his 4-month schedule.     May 4, 2022, successful cystoscopy with dilatation of 9 separate distal urethral strictures beginning 2 cm from the meatus and ending 3 cm distal to the external sphincter. They were all dilated to 24 Ni[place]an with minimal pain and minimal bleeding. He has been straining to urinate over the last week. We will maintain his 4-month schedule and see him again in September September 12, 2022, cystoscopy with dilatation of 9 separate distal urethral strictures beginning 4 cm from the meatus and ending 2 cm from the external sphincter. Quite dense today. Dilated to 24 Niuean. Minimal pain and moderate bleeding. The bladder itself appears normal. Urine analysis was negative for blood. Urine culture no growth. PSA  0.21. Patient believed by symptoms that he had a UTI, no culture given. He self treated with on hand Macrobid with good results. We will maintain his 4-month schedule.     January 3, 2023, cystoscopy with dilatation of 8 separate distal urethral strictures beginning 2 cm from the meatus and ending 4 cm from the external sphincter. These were dilated to 24 [place] with minimal bleeding and minimal discomfort. Moderately obstructed prostatic urethra with a trabeculated bladder. We will maintain his 4-month schedule.     April 28, 2023, cystoscopy with dilatation of 8 separate distal urethral strictures beginning 2 cm from the meatus with the last stricture being 4 cm from the external sphincter. Dilated to 24 [place] with moderate discomfort and no bleeding. Moderately obstructed prostatic urethra and a trabeculated bladder with debris. We will maintain the 4-month schedule.     September 11, 2023, cystoscopy with dilatation of 8 separate distal urethral strictures. These begin 2 cm from the meatus and and 4 cm from the external sphincter. Dilation to 24 [place] with minimal discomfort and minimal bleeding. Moderately obstructed prostatic urethra. Trabeculated bladder with minimal debris. He wishes to maintain his 4-month schedule. Urinalysis is negative for blood. Urine culture no growth. PSA 0.20.     January 8, 2024, cystoscopy with dilatation of 8 separate distal urethral strictures. These begin 2 cm from the meatus and end 4 cm from the external sphincter. Dilation to 24 [place] with minimal discomfort and minimal bleeding. Moderately obstructed prostatic urethra. Trabeculated bladder with minimal debris. He wishes to maintain his 4-month schedule.     May 20, 2024, cystoscopy with dilatation of 8 separate, distal urethral strictures.  The first is 2 cm from the meatus and the last which was quite dense was 4 cm from the external sphincter.  All dilated to 24 [place] with minimal bleeding and minimal discomfort.   Moderately obstructed prostatic urethra.  He will maintain his 4-month schedule.    September 2024, cystoscopy with dilatation canceled due to patient's severe poison ivy    October 18, 2024, cystoscopy with dilatation again of 8 separate distal urethral strictures.  These began 2 cm from the meatus and the last dense stricture is 4 cm distal from the external sphincter.  Dilated to 24 French with minimal bleeding and minimal pain.  Moderately obstructed prostatic urethra.  PSA 0.26.  Urine culture no growth.  Urinalysis is negative for blood.  Once again I have encouraged him to proceed definitive therapy.  He would be willing to see Dr. Tc Campo for evaluation of urethral reconstruction.  Otherwise I will see him again in January.     PLAN:     #1 the patient will return in January, May and September for cystoscopy with urethral dilatation to 24 French. MACROBID 100 mg PROPHYLAXIS, Begin 3 days PRIOR. We will maintain a 4 month schedule,     #2 UA, C&S, PSA and office visit in September only. MAINTAIN a 4 month schedule.     #3 Macrobid 100 mg by mouth every 12 hours mandy-intercourse when necessary.    4.  Consult Dr. Tc Campo for recurrent, multiple, dense urethral strictures for evaluation of definitive therapy.     Physical Exam  Constitutional:       Appearance: Normal appearance.   HENT:      Head: Normocephalic and atraumatic.   Pulmonary:      Effort: Pulmonary effort is normal.   Abdominal:      Palpations: Abdomen is soft.      Tenderness: There is no abdominal tenderness.   Genitourinary:     Penis: Normal.       Testes: Normal.   Musculoskeletal:         General: Normal range of motion.      Cervical back: Normal range of motion and neck supple.   Neurological:      General: No focal deficit present.      Mental Status: He is alert and oriented to person, place, and time.   Psychiatric:         Mood and Affect: Mood normal.         Behavior: Behavior normal.        This note was created with  voice-recognition software and was not corrected for typographical or grammatical errors

## 2024-10-18 NOTE — LETTER
"October 18, 2024     Telly Marques MD  [address] [facility] [address]  [facility] [address]    Patient: Armando Smith   YOB: 1960   Date of Visit: 10/18/2024       Dear Dr. Telly Marques MD:    Thank you for referring Armando Smith to me for evaluation. Below are my notes for this consultation.  If you have questions, please do not hesitate to call me. I look forward to following your patient along with you.       Sincerely,     Mynor Anderson MD      CC: No Recipients  ______________________________________________________________________________________      Provider Impressions     64 year-old white male with his wife Marj. Originally presented with URINARY INCONTINENCE, GROSS HEMATURIA, URETHRAL STRICTURE, and BIPOLAR DISORDER. He was experiencing 3 UTIs per year. He receives mandy-intercourse ciprofloxacin. No family history of prostate cancer. Never smoked cigarettes.     2008 at [facility], a [employer]ke[employer] CATHETER was placed. The patient claims that that \"started my problem\". He is now undergoing cystoscopy with urethral dilatation on a routine schedule.     10/01/12, PSA 0.5.     11/12/13, PSA 0.4, cystoscopy with urethral dilatation performed.     04/08/15 PSA 0.4, all urine testing negative. CYSTOSCOPY with URETHRAL DILATATION to 24 Australian. Patient states that 9 months was too long and that he was DRIBBLING and had poor stream over the last month. We will therefore change his schedule to 8 months and keep it at that range.     12/08/15, very significant DISTAL URETHRAL STRICTURE approximately 6 cm from the meatus. Measured and calibrated at 6 Australian. This was carefully dilated with bleeding. Urine studies were normal and PSA is 0.2. We will transformed to a new schedule. He will have dilations every 6 months, March and September. Office visit in September with UA, C&S, and PSA.     03/19/16, successful cystoscopy with URETHRAL DILATATION. Multiple, 8-10, STRICTURES in the distal " URETHRA DILATED to 24 Turkish with bleeding. We will maintain a 6 month schedule.     10/05/16, successful CYSTOSCOPY with URETHRAL DILATATION of 9 separate distal URETHRAL STRICTURES. There was some bleeding and discomfort. Urinalysis and urine culture negative. PSA is 0.2. We will maintain a 6 month schedule.     03/08/17, successful CYSTOSCOPY with URETHRAL DILATATION of 6 separate distal URETHRAL STRICTURES. There was moderate discomfort. Patient had a recent UTI which was resistant to Cipro. Therefore we will switch his prophylactic antibiotic to Macrobid. He will return in 6 months.     09/12/17, successful CYSTOSCOPY with URETHRAL DILATATION of of 6 separate distal URETHRAL STRICTURES, beginning at the meatus, to the external sphincter. There was mild discomfort. We will continue on his mandy-intercourse Macrobid. He did not obtain laboratory testing and will return soon for that. He will maintain a 6 month dilation schedule.     10/10/17, patient returns for office visit only. PSA 0.3, urinalysis negative, urine culture mixed. His wife Marj is with him today. He will return on 03/13/18 for dilation.     03/13/18, successful CYSTOSCOPY with URETHRAL DILATATION of of 6 separate distal URETHRAL STRICTURES, beginning 2 cm from the meatus, to the external sphincter. There was mild discomfort. We will continue on his mandy-intercourse Macrobid. we will maintain a 6 month schedule. He will return in September and that will also be his yearly visit.     09/11/18, successful CYSTOSCOPY with URETHRAL DILATATION of 8 separate distal URETHRAL STRICTURES, beginning 2 cm from the meatus. These extend to the EXTERNAL SPHINCTER. They were extremely dense and very difficult to dilate today. There was moderate discomfort. We will transition to a 4 month schedule instead of 6. He will continue with mandy-intercourse Macrobid. Urinalysis and urine culture are normal. PSA is 1.86.     01/08/19, successful CYSTOSCOPY WITH URETHRAL  DILATATION once again of 8 SEPARATE DISTAL URETHRAL STRICTURES beginning 2 cm from the meatus and ending 4 cm from the external sphincter. Minimal pain and moderate bleeding. I believe the 4 month schedule is at the threshold and we will maintain this regimen.     05/08/19, successful CYSTOSCOPY WITH URETHRAL DILATATION of 8 separate distal u caps that s beginning 2 cm from the meatus and ending 2 cm from the EXTERNAL SPHINCTER. Minimal pain and moderate bleeding. Patient states that the 4 month schedule is dramatically better, adding that he only notices minimal discomfort with dilation and his stream is only mildly slower the last month. We will continue to maintain that regimen.     09/18/19, successful CYSTOSCOPY WITH URETHRAL DILATATION of 8 separate distal URETHRAL STRICTURES beginning 2 cm from the meatus and ending 2 cm from the external sphincter. Minimal pain and moderate bleeding. Patient states that the 4 month schedule is perfect. He is starting to notice decreased flow stream and the discomfort with dilatation is only minimal. We will maintain that regimen. Urinalysis shows no blood, urine culture no growth, and PSA 0.2.     01/08/20, successful CYSTOSCOPY WITH URETHRAL DILATATION OF 12 SEPARATE DISTAL URETHRAL STRICTURES beginning 2 cm from the meatus and ending 2 cm from the external sphincter to 24 Guatemalan. Minimal pain and minimal bleeding. He is very happy with the 4 month schedule. He also was noticed that he has not had a UTI for several years. We will maintain this regimen.     05/22/20, successful CYSTOSCOPY WITH URETHRAL DILATATION of 12 separate DISTAL URETHRAL STRICTURES beginning 2 cm from the meatus and ending 2 cm from the external sphincter to 24 Guatemalan with minimal pain. He is very pleased with this 4 month schedule.     09/25/20, successful CYSTOSCOPY WITH URETHRAL DILATATION of 12 distinct DISTAL URETHRAL STRICTURES beginning at 2 cm from the meatus and ending 2 cm from the external  sphincter, to 24 Welsh with minimal pain and no bleeding. Urinalysis and urine culture are negative. PSA is 0.20. We will maintain a 4 month routine.     March 1, 2021, successful CYSTOSCOPY WITH URETHRAL DILATATION OF 10 DISTINCT DISTAL URETHRAL STRICTURES beginning 2 cm proximal to the meatus and ending 2 cm distal from the external sphincter. These were dilated to 24 Welsh with moderate pain and no bleeding. Patient is late for his routine dilation and the strictures were quite dense. We will have him return in May and then resume his 4-month schedule.     May 8, 2021, successful cystoscopy with urethral dilatation of 10 separate distal urethral strictures. The first beginning 2 cm proximal to the meatus and the last being 2 cm distal from the external sphincter. They were dilated to 24 Welsh with minimal pain and minimal bleeding. He will return in September and maintain a 4-month regimen.  He is not interested in definitive surgical repair.     September 3, 2021, successful cystoscopy with dilatation of 10 separate distal urethral strictures. The first is encountered 2 cm from the meatus and the last being 2 cm distal from the external sphincter. All dilated to 24 Welsh with minimal bleeding. Patient will return in January and maintain a 4-month regimen. Once again we discussed a definitive surgical repair which he refuses. Urinalysis is negative, urine culture no growth, PSA 0.20.     January 7, 2022, successful cystoscopy with dilatation of 10 separate distal urethral strictures beginning 2 cm from the meatus and ending 2 cm distal to the external sphincter. All dilated to 24 Welsh with moderate pain and minimal bleeding. Patient states that over the last 2 weeks he has been noticing a decreased flow of stream. He will return in May and we will maintain his 4-month schedule.     May 4, 2022, successful cystoscopy with dilatation of 9 separate distal urethral strictures beginning 2 cm from the meatus and  ending 3 cm distal to the external sphincter. They were all dilated to 24 Malaysian with minimal pain and minimal bleeding. He has been straining to urinate over the last week. We will maintain his 4-month schedule and see him again in September September 12, 2022, cystoscopy with dilatation of 9 separate distal urethral strictures beginning 4 cm from the meatus and ending 2 cm from the external sphincter. Quite dense today. Dilated to 24 Malaysian. Minimal pain and moderate bleeding. The bladder itself appears normal. Urine analysis was negative for blood. Urine culture no growth. PSA 0.21. Patient believed by symptoms that he had a UTI, no culture given. He self treated with on hand Macrobid with good results. We will maintain his 4-month schedule.     January 3, 2023, cystoscopy with dilatation of 8 separate distal urethral strictures beginning 2 cm from the meatus and ending 4 cm from the external sphincter. These were dilated to 24 Malaysian with minimal bleeding and minimal discomfort. Moderately obstructed prostatic urethra with a trabeculated bladder. We will maintain his 4-month schedule.     April 28, 2023, cystoscopy with dilatation of 8 separate distal urethral strictures beginning 2 cm from the meatus with the last stricture being 4 cm from the external sphincter. Dilated to 24 Malaysian with moderate discomfort and no bleeding. Moderately obstructed prostatic urethra and a trabeculated bladder with debris. We will maintain the 4-month schedule.     September 11, 2023, cystoscopy with dilatation of 8 separate distal urethral strictures. These begin 2 cm from the meatus and and 4 cm from the external sphincter. Dilation to 24 Malaysian with minimal discomfort and minimal bleeding. Moderately obstructed prostatic urethra. Trabeculated bladder with minimal debris. He wishes to maintain his 4-month schedule. Urinalysis is negative for blood. Urine culture no growth. PSA 0.20.     January 8, 2024, cystoscopy with  dilatation of 8 separate distal urethral strictures. These begin 2 cm from the meatus and end 4 cm from the external sphincter. Dilation to 24 French with minimal discomfort and minimal bleeding. Moderately obstructed prostatic urethra. Trabeculated bladder with minimal debris. He wishes to maintain his 4-month schedule.     May 20, 2024, cystoscopy with dilatation of 8 separate, distal urethral strictures.  The first is 2 cm from the meatus and the last which was quite dense was 4 cm from the external sphincter.  All dilated to 24 French with minimal bleeding and minimal discomfort.  Moderately obstructed prostatic urethra.  He will maintain his 4-month schedule.    September 2024, cystoscopy with dilatation canceled due to patient's severe poison ivy    October 18, 2024, cystoscopy with dilatation again of 8 separate distal urethral strictures.  These began 2 cm from the meatus and the last dense stricture is 4 cm distal from the external sphincter.  Dilated to 24 French with minimal bleeding and minimal pain.  Moderately obstructed prostatic urethra.  PSA 0.26.  Urine culture no growth.  Urinalysis is negative for blood.  Once again I have encouraged him to proceed definitive therapy.  He would be willing to see Dr. Tc Campo for evaluation of urethral reconstruction.  Otherwise I will see him again in January.     PLAN:     #1 the patient will return in January, May and September for cystoscopy with urethral dilatation to 24 French. MACROBID 100 mg PROPHYLAXIS, Begin 3 days PRIOR. We will maintain a 4 month schedule,     #2 UA, C&S, PSA and office visit in September only. MAINTAIN a 4 month schedule.     #3 Macrobid 100 mg by mouth every 12 hours mandy-intercourse when necessary.    4.  Consult Dr. Tc Campo for recurrent, multiple, dense urethral strictures for evaluation of definitive therapy.     Physical Exam  Constitutional:       Appearance: Normal appearance.   HENT:      Head: Normocephalic and  atraumatic.   Pulmonary:      Effort: Pulmonary effort is normal.   Abdominal:      Palpations: Abdomen is soft.      Tenderness: There is no abdominal tenderness.   Genitourinary:     Penis: Normal.       Testes: Normal.   Musculoskeletal:         General: Normal range of motion.      Cervical back: Normal range of motion and neck supple.   Neurological:      General: No focal deficit present.      Mental Status: He is alert and oriented to person, place, and time.   Psychiatric:         Mood and Affect: Mood normal.         Behavior: Behavior normal.        This note was created with voice-recognition software and was not corrected for typographical or grammatical errors

## 2024-12-18 ENCOUNTER — APPOINTMENT (OUTPATIENT)
Dept: UROLOGY | Facility: CLINIC | Age: 64
End: 2024-12-18
Payer: MEDICARE

## 2025-01-14 RX ORDER — NITROFURANTOIN 25; 75 MG/1; MG/1
100 CAPSULE ORAL EVERY 12 HOURS
Qty: 10 CAPSULE | Refills: 0 | Status: SHIPPED | OUTPATIENT
Start: 2025-01-14 | End: 2025-01-19

## 2025-01-21 ENCOUNTER — APPOINTMENT (OUTPATIENT)
Dept: UROLOGY | Facility: CLINIC | Age: 65
End: 2025-01-21
Payer: MEDICARE

## 2025-01-21 VITALS
BODY MASS INDEX: 40.16 KG/M2 | RESPIRATION RATE: 20 BRPM | HEIGHT: 68 IN | HEART RATE: 64 BPM | WEIGHT: 265 LBS | DIASTOLIC BLOOD PRESSURE: 72 MMHG | SYSTOLIC BLOOD PRESSURE: 137 MMHG

## 2025-01-21 DIAGNOSIS — N39.0 URINARY TRACT INFECTION WITHOUT HEMATURIA, SITE UNSPECIFIED: ICD-10-CM

## 2025-01-21 DIAGNOSIS — N35.014 POST-TRAUMATIC MALE URETHRAL STRICTURE: Primary | ICD-10-CM

## 2025-01-21 DIAGNOSIS — R31.0 GROSS HEMATURIA: ICD-10-CM

## 2025-01-21 PROCEDURE — 52281 CYSTOSCOPY AND TREATMENT: CPT | Performed by: UROLOGY

## 2025-01-21 ASSESSMENT — ENCOUNTER SYMPTOMS
OCCASIONAL FEELINGS OF UNSTEADINESS: 0
DEPRESSION: 0
LOSS OF SENSATION IN FEET: 0

## 2025-01-21 NOTE — PROGRESS NOTES
"  Provider Impressions     64 year-old white male with his wife Marj. Originally presented with URINARY INCONTINENCE, GROSS HEMATURIA, URETHRAL STRICTURE, and BIPOLAR DISORDER. He was experiencing 3 UTIs per year. He receives mandy-intercourse ciprofloxacin. No family history of prostate cancer. Never smoked cigarettes.     2008 at [facility], a [place]kew CATHETER was placed. The patient claims that that \"started my problem\". He is now undergoing cystoscopy with urethral dilatation on a routine schedule.     10/01/12, PSA 0.5.     11/12/13, PSA 0.4, cystoscopy with urethral dilatation performed.     04/08/15 PSA 0.4, all urine testing negative. CYSTOSCOPY with URETHRAL DILATATION to 24 Cameroonian. Patient states that 9 months was too long and that he was DRIBBLING and had poor stream over the last month. We will therefore change his schedule to 8 months and keep it at that range.     12/08/15, very significant DISTAL URETHRAL STRICTURE approximately 6 cm from the meatus. Measured and calibrated at 6 Cameroonian. This was carefully dilated with bleeding. Urine studies were normal and PSA is 0.2. We will transform to a new schedule. He will have dilations every 6 months, March and September. Office visit in September with UA, C&S, and PSA.     03/19/16, successful cystoscopy with URETHRAL DILATATION. Multiple, 8-10, STRICTURES in the distal URETHRA DILATED to 24 Cameroonian with bleeding. We will maintain a 6 month schedule.     10/05/16, successful CYSTOSCOPY with URETHRAL DILATATION of 9 separate distal URETHRAL STRICTURES. There was some bleeding and discomfort. Urinalysis and urine culture negative. PSA is 0.2. We will maintain a 6 month schedule.     03/08/17, successful CYSTOSCOPY with URETHRAL DILATATION of 6 separate distal URETHRAL STRICTURES. There was moderate discomfort. Patient had a recent UTI which was resistant to Cipro. Therefore we will switch his prophylactic antibiotic to Macrobid. He will return in 6 months.   "   09/12/17, successful CYSTOSCOPY with URETHRAL DILATATION of of 6 separate distal URETHRAL STRICTURES, beginning at the meatus, to the external sphincter. There was mild discomfort. We will continue on his mandy-intercourse Macrobid. He did not obtain laboratory testing and will return soon for that. He will maintain a 6 month dilation schedule.     10/10/17, patient returns for office visit only. PSA 0.3, urinalysis negative, urine culture mixed. His wife Marj is with him today. He will return on 03/13/18 for dilation.     03/13/18, successful CYSTOSCOPY with URETHRAL DILATATION of of 6 separate distal URETHRAL STRICTURES, beginning 2 cm from the meatus, to the external sphincter. There was mild discomfort. We will continue on his mandy-intercourse Macrobid. we will maintain a 6 month schedule. He will return in September and that will also be his yearly visit.     09/11/18, successful CYSTOSCOPY with URETHRAL DILATATION of 8 separate distal URETHRAL STRICTURES, beginning 2 cm from the meatus. These extend to the EXTERNAL SPHINCTER. They were extremely dense and very difficult to dilate today. There was moderate discomfort. We will transition to a 4 month schedule instead of 6. He will continue with mandy-intercourse Macrobid. Urinalysis and urine culture are normal. PSA is 1.86.     01/08/19, successful CYSTOSCOPY WITH URETHRAL DILATATION once again of 8 SEPARATE DISTAL URETHRAL STRICTURES beginning 2 cm from the meatus and ending 4 cm from the external sphincter. Minimal pain and moderate bleeding. I believe the 4 month schedule is at the threshold and we will maintain this regimen.     05/08/19, successful CYSTOSCOPY WITH URETHRAL DILATATION of 8 separate distal u caps that s beginning 2 cm from the meatus and ending 2 cm from the EXTERNAL SPHINCTER. Minimal pain and moderate bleeding. Patient states that the 4 month schedule is dramatically better, adding that he only notices minimal discomfort with dilation  and his stream is only mildly slower the last month. We will continue to maintain that regimen.     09/18/19, successful CYSTOSCOPY WITH URETHRAL DILATATION of 8 separate distal URETHRAL STRICTURES beginning 2 cm from the meatus and ending 2 cm from the external sphincter. Minimal pain and moderate bleeding. Patient states that the 4 month schedule is perfect. He is starting to notice decreased flow stream and the discomfort with dilatation is only minimal. We will maintain that regimen. Urinalysis shows no blood, urine culture no growth, and PSA 0.2.     01/08/20, successful CYSTOSCOPY WITH URETHRAL DILATATION OF 12 SEPARATE DISTAL URETHRAL STRICTURES beginning 2 cm from the meatus and ending 2 cm from the external sphincter to 24 Prydeinig. Minimal pain and minimal bleeding. He is very happy with the 4 month schedule. He also was noticed that he has not had a UTI for several years. We will maintain this regimen.     05/22/20, successful CYSTOSCOPY WITH URETHRAL DILATATION of 12 separate DISTAL URETHRAL STRICTURES beginning 2 cm from the meatus and ending 2 cm from the external sphincter to 24 Prydeinig with minimal pain. He is very pleased with this 4 month schedule.     09/25/20, successful CYSTOSCOPY WITH URETHRAL DILATATION of 12 distinct DISTAL URETHRAL STRICTURES beginning at 2 cm from the meatus and ending 2 cm from the external sphincter, to 24 Prydeinig with minimal pain and no bleeding. Urinalysis and urine culture are negative. PSA is 0.20. We will maintain a 4 month routine.     March 1, 2021, successful CYSTOSCOPY WITH URETHRAL DILATATION OF 10 DISTINCT DISTAL URETHRAL STRICTURES beginning 2 cm proximal to the meatus and ending 2 cm distal from the external sphincter. These were dilated to 24 Prydeinig with moderate pain and no bleeding. Patient is late for his routine dilation and the strictures were quite dense. We will have him return in May and then resume his 4-month schedule.     May 8, 2021, successful  cystoscopy with urethral dilatation of 10 separate distal urethral strictures. The first beginning 2 cm proximal to the meatus and the last being 2 cm distal from the external sphincter. They were dilated to 24 Sammarinese with minimal pain and minimal bleeding. He will return in September and maintain a 4-month regimen.  He is not interested in definitive surgical repair.     September 3, 2021, successful cystoscopy with dilatation of 10 separate distal urethral strictures. The first is encountered 2 cm from the meatus and the last being 2 cm distal from the external sphincter. All dilated to 24 Sammarinese with minimal bleeding. Patient will return in January and maintain a 4-month regimen. Once again we discussed a definitive surgical repair which he refuses. Urinalysis is negative, urine culture no growth, PSA 0.20.     January 7, 2022, successful cystoscopy with dilatation of 10 separate distal urethral strictures beginning 2 cm from the meatus and ending 2 cm distal to the external sphincter. All dilated to 24 Sammarinese with moderate pain and minimal bleeding. Patient states that over the last 2 weeks he has been noticing a decreased flow of stream. He will return in May and we will maintain his 4-month schedule.     May 4, 2022, successful cystoscopy with dilatation of 9 separate distal urethral strictures beginning 2 cm from the meatus and ending 3 cm distal to the external sphincter. They were all dilated to 24 Sammarinese with minimal pain and minimal bleeding. He has been straining to urinate over the last week. We will maintain his 4-month schedule and see him again in September September 12, 2022, cystoscopy with dilatation of 9 separate distal urethral strictures beginning 4 cm from the meatus and ending 2 cm from the external sphincter. Quite dense today. Dilated to 24 Sammarinese. Minimal pain and moderate bleeding. The bladder itself appears normal. Urine analysis was negative for blood. Urine culture no growth. PSA  0.21. Patient believed by symptoms that he had a UTI, no culture given. He self treated with on hand Macrobid with good results. We will maintain his 4-month schedule.     January 3, 2023, cystoscopy with dilatation of 8 separate distal urethral strictures beginning 2 cm from the meatus and ending 4 cm from the external sphincter. These were dilated to 24 Finnish with minimal bleeding and minimal discomfort. Moderately obstructed prostatic urethra with a trabeculated bladder. We will maintain his 4-month schedule.     April 28, 2023, cystoscopy with dilatation of 8 separate distal urethral strictures beginning 2 cm from the meatus with the last stricture being 4 cm from the external sphincter. Dilated to 24 Finnish with moderate discomfort and no bleeding. Moderately obstructed prostatic urethra and a trabeculated bladder with debris. We will maintain the 4-month schedule.     September 11, 2023, cystoscopy with dilatation of 8 separate distal urethral strictures. These begin 2 cm from the meatus and and 4 cm from the external sphincter. Dilation to 24 Finnish with minimal discomfort and minimal bleeding. Moderately obstructed prostatic urethra. Trabeculated bladder with minimal debris. He wishes to maintain his 4-month schedule. Urinalysis is negative for blood. Urine culture no growth. PSA 0.20.     January 8, 2024, cystoscopy with dilatation of 8 separate distal urethral strictures. These begin 2 cm from the meatus and end 4 cm from the external sphincter. Dilation to 24 Finnish with minimal discomfort and minimal bleeding. Moderately obstructed prostatic urethra. Trabeculated bladder with minimal debris. He wishes to maintain his 4-month schedule.     May 20, 2024, cystoscopy with dilatation of 8 separate, distal urethral strictures.  The first is 2 cm from the meatus and the last which was quite dense was 4 cm from the external sphincter.  All dilated to 24 Finnish with minimal bleeding and minimal discomfort.   Moderately obstructed prostatic urethra.  He will maintain his 4-month schedule.     September 2024, cystoscopy with dilatation canceled due to patient's severe poison ivy     October 18, 2024, cystoscopy with dilatation again of 8 separate distal urethral strictures.  These began 2 cm from the meatus and the last dense stricture is 4 cm distal from the external sphincter.  Dilated to 24 French with minimal bleeding and minimal pain.  Moderately obstructed prostatic urethra.  PSA 0.26.  Urine culture no growth.  Urinalysis is negative for blood.  Once again I have encouraged him to proceed definitive therapy.  He would be willing to see Dr. Tc Campo for evaluation of urethral reconstruction.  Otherwise I will see him again in January.    December 2024, patient cancels his referral with Dr. Tc Campo.    January 21, 2025, cystoscopy with dilatation of 8 separate distal urethral strictures beginning 2 cm from the meatus and ending 4 cm distal from the external sphincter.  Minimal discomfort with no bleeding.  Dilated to 24 French.  Moderately obstructed prostatic urethra and normal-appearing bladder.  Patient wishes to maintain his 4-month schedule and is not interested in formal evaluation of urethral reconstruction.     PLAN:     #1 the patient will return in January, May and September for cystoscopy with urethral dilatation to 24 French. MACROBID 100 mg PROPHYLAXIS, Begin 3 days PRIOR. We will maintain a 4 month schedule,     #2 UA, C&S, PSA and office visit in September only. MAINTAIN a 4 month schedule.     #3 Macrobid 100 mg by mouth every 12 hours mandy-intercourse when necessary.     Physical Exam  Constitutional:       Appearance: Normal appearance.   HENT:      Head: Normocephalic and atraumatic.   Pulmonary:      Effort: Pulmonary effort is normal.   Abdominal:      Palpations: Abdomen is soft.      Tenderness: There is no abdominal tenderness.   Genitourinary:     Penis: Normal.       Testes:  Normal.   Musculoskeletal:         General: Normal range of motion.      Cervical back: Normal range of motion and neck supple.   Neurological:      General: No focal deficit present.      Mental Status: He is alert and oriented to person, place, and time.   Psychiatric:         Mood and Affect: Mood normal.         Behavior: Behavior normal.        This note was created with voice-recognition software and was not corrected for typographical or grammatical errors

## 2025-01-21 NOTE — PROGRESS NOTES
"Pt took Macrobid 100mg po 3 days prior  Anesthesia: Local 2% Lidocaine  Instruments: 6F flexible disposable Cystoscope    Pt brought to procedure room and placed in dorsal lithotomy position. Pt draped and prepped in normal sterile fashion. 5ml lidocaine instilled into urethral meatus and 5ml instilled into urethra (penis). Pt tolerated well.    I was present as chaperone for the entirety of the procedure   Idania Gibson  Cystoscopy performed by Dr. Mynor Anderson      Bedside \"Time Out\" Verification   Today's Date:  1/21/25. I attest that this time out verification took place prior to the procedure.   Procedure: Cysto   RN/LPN/MA:    Provider: [facility].   Verified By: Idania SLAUGHTER and Provider, Dr. Mynor Anderson.   Prior to the start of the procedure a time out was taken and the following were verified: the identity of the patient using two patient identifiers, the correct procedure, the correct site marked as indicated, the correct positioning for the patient and the correct equipment was obtained.   Cystoscopy - Armando Smith-identified using two (2) forms of identification.   Procedure: diagnostic Cystourethroscopy  Procedure Note: Time Started: 9:30am     Time Completed: 9:55 AM  Indications for procedure: Cysto with Dilatation 24F for urethral stricture.         Discussed with patient: Risks, benefits, and alternative were discussed in detail. Patient appears to understand and agrees to proceed. Patient has signed the procedure consent form.    CYSTOSCOPY:    Cystoscopy today reveals multiple distal urethral strictures beginning 2 cm from the meatus and ending 4 cm from the external sphincter.  These were all dilated to 24 Spanish with minimal discomfort and no bleeding.  Once inside the bladder, no evidence of stones or tumors.  "

## 2025-01-21 NOTE — PATIENT INSTRUCTIONS
Patient Discussion/Summary     Good to see you once again. You had a successful cystoscopy with urethral dilatation of 8 strictures. The strictures were quite dense today and you wish to maintain a 4 month schedule.  Once again, we had a discussion regarding definitive treatment for your chronic urethral strictures.  You decided not to consider urethral reconstruction with Dr. Tc Campo.      This note was created with voice-recognition software and was not corrected for typographical or grammatical errors.

## 2025-05-16 RX ORDER — NITROFURANTOIN 25; 75 MG/1; MG/1
100 CAPSULE ORAL EVERY 12 HOURS
Qty: 10 CAPSULE | Refills: 0 | Status: SHIPPED | OUTPATIENT
Start: 2025-05-16 | End: 2025-05-21

## 2025-05-21 ENCOUNTER — APPOINTMENT (OUTPATIENT)
Dept: UROLOGY | Facility: CLINIC | Age: 65
End: 2025-05-21
Payer: MEDICARE

## 2025-05-21 VITALS
RESPIRATION RATE: 14 BRPM | SYSTOLIC BLOOD PRESSURE: 136 MMHG | HEART RATE: 62 BPM | BODY MASS INDEX: 41.16 KG/M2 | WEIGHT: 270.73 LBS | DIASTOLIC BLOOD PRESSURE: 70 MMHG

## 2025-05-21 DIAGNOSIS — N35.911 STRICTURE OF URETHRAL MEATUS IN MALE, UNSPECIFIED STRICTURE TYPE: ICD-10-CM

## 2025-05-21 DIAGNOSIS — N39.0 URINARY TRACT INFECTION WITHOUT HEMATURIA, SITE UNSPECIFIED: ICD-10-CM

## 2025-05-21 DIAGNOSIS — N35.014 POST-TRAUMATIC MALE URETHRAL STRICTURE: Primary | ICD-10-CM

## 2025-05-21 DIAGNOSIS — R31.0 GROSS HEMATURIA: ICD-10-CM

## 2025-05-21 PROCEDURE — 51798 US URINE CAPACITY MEASURE: CPT | Performed by: UROLOGY

## 2025-05-21 PROCEDURE — 52281 CYSTOSCOPY AND TREATMENT: CPT | Performed by: UROLOGY

## 2025-05-21 NOTE — PROGRESS NOTES
"Provider Impressions     64 year-old white male with his wife Marj. Originally presented with URINARY INCONTINENCE, GROSS HEMATURIA, URETHRAL STRICTURE, and BIPOLAR DISORDER. He was experiencing 3 UTIs per year. He receives mandy-intercourse ciprofloxacin. No family history of prostate cancer. Never smoked cigarettes.     2008 at [facility], a Askew CATHETER was placed. The patient claims that that \"started my problem\". He is now undergoing cystoscopy with urethral dilatation on a routine schedule.     10/01/12, PSA 0.5.     11/12/13, PSA 0.4, cystoscopy with urethral dilatation performed.     04/08/15 PSA 0.4, all urine testing negative. CYSTOSCOPY with URETHRAL DILATATION to 24 Tristanian. Patient states that 9 months was too long and that he was DRIBBLING and had poor stream over the last month. We will therefore change his schedule to 8 months and keep it at that range.     12/08/15, very significant DISTAL URETHRAL STRICTURE approximately 6 cm from the meatus. Measured and calibrated at 6 Tristanian. This was carefully dilated with bleeding. Urine studies were normal and PSA is 0.2. We will transform to a new schedule. He will have dilations every 6 months, March and September. Office visit in September with UA, C&S, and PSA.     03/19/16, successful cystoscopy with URETHRAL DILATATION. Multiple, 8-10, STRICTURES in the distal URETHRA DILATED to 24 Tristanian with bleeding. We will maintain a 6 month schedule.     10/05/16, successful CYSTOSCOPY with URETHRAL DILATATION of 9 separate distal URETHRAL STRICTURES. There was some bleeding and discomfort. Urinalysis and urine culture negative. PSA is 0.2. We will maintain a 6 month schedule.     03/08/17, successful CYSTOSCOPY with URETHRAL DILATATION of 6 separate distal URETHRAL STRICTURES. There was moderate discomfort. Patient had a recent UTI which was resistant to Cipro. Therefore we will switch his prophylactic antibiotic to Macrobid. He will return in 6 months.   "   09/12/17, successful CYSTOSCOPY with URETHRAL DILATATION of of 6 separate distal URETHRAL STRICTURES, beginning at the meatus, to the external sphincter. There was mild discomfort. We will continue on his mandy-intercourse Macrobid. He did not obtain laboratory testing and will return soon for that. He will maintain a 6 month dilation schedule.     10/10/17, patient returns for office visit only. PSA 0.3, urinalysis negative, urine culture mixed. His wife Marj is with him today. He will return on 03/13/18 for dilation.     03/13/18, successful CYSTOSCOPY with URETHRAL DILATATION of of 6 separate distal URETHRAL STRICTURES, beginning 2 cm from the meatus, to the external sphincter. There was mild discomfort. We will continue on his mandy-intercourse Macrobid. we will maintain a 6 month schedule. He will return in September and that will also be his yearly visit.     09/11/18, successful CYSTOSCOPY with URETHRAL DILATATION of 8 separate distal URETHRAL STRICTURES, beginning 2 cm from the meatus. These extend to the EXTERNAL SPHINCTER. They were extremely dense and very difficult to dilate today. There was moderate discomfort. We will transition to a 4 month schedule instead of 6. He will continue with mandy-intercourse Macrobid. Urinalysis and urine culture are normal. PSA is 1.86.     01/08/19, successful CYSTOSCOPY WITH URETHRAL DILATATION once again of 8 SEPARATE DISTAL URETHRAL STRICTURES beginning 2 cm from the meatus and ending 4 cm from the external sphincter. Minimal pain and moderate bleeding. I believe the 4 month schedule is at the threshold and we will maintain this regimen.     05/08/19, successful CYSTOSCOPY WITH URETHRAL DILATATION of 8 separate distal u caps that s beginning 2 cm from the meatus and ending 2 cm from the EXTERNAL SPHINCTER. Minimal pain and moderate bleeding. Patient states that the 4 month schedule is dramatically better, adding that he only notices minimal discomfort with dilation  and his stream is only mildly slower the last month. We will continue to maintain that regimen.     09/18/19, successful CYSTOSCOPY WITH URETHRAL DILATATION of 8 separate distal URETHRAL STRICTURES beginning 2 cm from the meatus and ending 2 cm from the external sphincter. Minimal pain and moderate bleeding. Patient states that the 4 month schedule is perfect. He is starting to notice decreased flow stream and the discomfort with dilatation is only minimal. We will maintain that regimen. Urinalysis shows no blood, urine culture no growth, and PSA 0.2.     01/08/20, successful CYSTOSCOPY WITH URETHRAL DILATATION OF 12 SEPARATE DISTAL URETHRAL STRICTURES beginning 2 cm from the meatus and ending 2 cm from the external sphincter to 24 Indonesian. Minimal pain and minimal bleeding. He is very happy with the 4 month schedule. He also was noticed that he has not had a UTI for several years. We will maintain this regimen.     05/22/20, successful CYSTOSCOPY WITH URETHRAL DILATATION of 12 separate DISTAL URETHRAL STRICTURES beginning 2 cm from the meatus and ending 2 cm from the external sphincter to 24 Indonesian with minimal pain. He is very pleased with this 4 month schedule.     09/25/20, successful CYSTOSCOPY WITH URETHRAL DILATATION of 12 distinct DISTAL URETHRAL STRICTURES beginning at 2 cm from the meatus and ending 2 cm from the external sphincter, to 24 Indonesian with minimal pain and no bleeding. Urinalysis and urine culture are negative. PSA is 0.20. We will maintain a 4 month routine.     March 1, 2021, successful CYSTOSCOPY WITH URETHRAL DILATATION OF 10 DISTINCT DISTAL URETHRAL STRICTURES beginning 2 cm proximal to the meatus and ending 2 cm distal from the external sphincter. These were dilated to 24 Indonesian with moderate pain and no bleeding. Patient is late for his routine dilation and the strictures were quite dense. We will have him return in May and then resume his 4-month schedule.     May 8, 2021, successful  cystoscopy with urethral dilatation of 10 separate distal urethral strictures. The first beginning 2 cm proximal to the meatus and the last being 2 cm distal from the external sphincter. They were dilated to 24 Estonian with minimal pain and minimal bleeding. He will return in September and maintain a 4-month regimen.  He is not interested in definitive surgical repair.     September 3, 2021, successful cystoscopy with dilatation of 10 separate distal urethral strictures. The first is encountered 2 cm from the meatus and the last being 2 cm distal from the external sphincter. All dilated to 24 Estonian with minimal bleeding. Patient will return in January and maintain a 4-month regimen. Once again we discussed a definitive surgical repair which he refuses. Urinalysis is negative, urine culture no growth, PSA 0.20.     January 7, 2022, successful cystoscopy with dilatation of 10 separate distal urethral strictures beginning 2 cm from the meatus and ending 2 cm distal to the external sphincter. All dilated to 24 Estonian with moderate pain and minimal bleeding. Patient states that over the last 2 weeks he has been noticing a decreased flow of stream. He will return in May and we will maintain his 4-month schedule.     May 4, 2022, successful cystoscopy with dilatation of 9 separate distal urethral strictures beginning 2 cm from the meatus and ending 3 cm distal to the external sphincter. They were all dilated to 24 Estonian with minimal pain and minimal bleeding. He has been straining to urinate over the last week. We will maintain his 4-month schedule and see him again in September September 12, 2022, cystoscopy with dilatation of 9 separate distal urethral strictures beginning 4 cm from the meatus and ending 2 cm from the external sphincter. Quite dense today. Dilated to 24 Estonian. Minimal pain and moderate bleeding. The bladder itself appears normal. Urine analysis was negative for blood. Urine culture no growth. PSA  0.21. Patient believed by symptoms that he had a UTI, no culture given. He self treated with on hand Macrobid with good results. We will maintain his 4-month schedule.     January 3, 2023, cystoscopy with dilatation of 8 separate distal urethral strictures beginning 2 cm from the meatus and ending 4 cm from the external sphincter. These were dilated to 24 Guamanian with minimal bleeding and minimal discomfort. Moderately obstructed prostatic urethra with a trabeculated bladder. We will maintain his 4-month schedule.     April 28, 2023, cystoscopy with dilatation of 8 separate distal urethral strictures beginning 2 cm from the meatus with the last stricture being 4 cm from the external sphincter. Dilated to 24 Guamanian with moderate discomfort and no bleeding. Moderately obstructed prostatic urethra and a trabeculated bladder with debris. We will maintain the 4-month schedule.     September 11, 2023, cystoscopy with dilatation of 8 separate distal urethral strictures. These begin 2 cm from the meatus and and 4 cm from the external sphincter. Dilation to 24 Guamanian with minimal discomfort and minimal bleeding. Moderately obstructed prostatic urethra. Trabeculated bladder with minimal debris. He wishes to maintain his 4-month schedule. Urinalysis is negative for blood. Urine culture no growth. PSA 0.20.     January 8, 2024, cystoscopy with dilatation of 8 separate distal urethral strictures. These begin 2 cm from the meatus and end 4 cm from the external sphincter. Dilation to 24 Guamanian with minimal discomfort and minimal bleeding. Moderately obstructed prostatic urethra. Trabeculated bladder with minimal debris. He wishes to maintain his 4-month schedule.     May 20, 2024, cystoscopy with dilatation of 8 separate, distal urethral strictures.  The first is 2 cm from the meatus and the last which was quite dense was 4 cm from the external sphincter.  All dilated to 24 Guamanian with minimal bleeding and minimal discomfort.   Moderately obstructed prostatic urethra.  He will maintain his 4-month schedule.     September 2024, cystoscopy with dilatation canceled due to patient's severe poison ivy     October 18, 2024, cystoscopy with dilatation again of 8 separate distal urethral strictures.  These began 2 cm from the meatus and the last dense stricture is 4 cm distal from the external sphincter.  Dilated to 24 French with minimal bleeding and minimal pain.  Moderately obstructed prostatic urethra.  PSA 0.26.  Urine culture no growth.  Urinalysis is negative for blood.  Once again I have encouraged him to proceed definitive therapy.  He would be willing to see Dr. Tc Campo for evaluation of urethral reconstruction.  Otherwise I will see him again in January.     December 2024, patient cancels his referral with Dr. Tc Campo.     January 21, 2025, cystoscopy with dilatation of 8 separate distal urethral strictures beginning 2 cm from the meatus and ending 4 cm distal from the external sphincter.  Minimal discomfort with no bleeding.  Dilated to 24 French.  Moderately obstructed prostatic urethra and normal-appearing bladder.  Patient wishes to maintain his 4-month schedule and is not interested in formal evaluation of urethral reconstruction.    May 21, 2025, cystoscopy with dilatation of 8 distinct urethral distal strictures beginning 2 cm from the meatus and ending 4 cm distal from the external sphincter.  Minimal discomfort with no bleeding.  All dilated to 24 French.  PVR 66 cc.  Moderately obstructed prostatic urethra.  Normal-appearing bladder without any stones or tumors.  Patient wishes to maintain his 4-month schedule.  He has canceled several appointments to meet with other urologist regarding urethral reconstruction.     PLAN:     #1 the patient will return in January, May and September for cystoscopy with urethral dilatation to 24 French. MACROBID 100 mg PROPHYLAXIS, Begin 3 days PRIOR. We will maintain a 4 month  schedule,     #2 UA, C&S, PSA and office visit in September only. MAINTAIN a 4 month schedule.     #3 Macrobid 100 mg by mouth every 12 hours mandy-intercourse when necessary.     Physical Exam  Constitutional:       Appearance: Normal appearance.   HENT:      Head: Normocephalic and atraumatic.   Pulmonary:      Effort: Pulmonary effort is normal.   Abdominal:      Palpations: Abdomen is soft.      Tenderness: There is no abdominal tenderness.   Genitourinary:     Penis: Normal.       Testes: Normal.   Musculoskeletal:         General: Normal range of motion.      Cervical back: Normal range of motion and neck supple.   Neurological:      General: No focal deficit present.      Mental Status: He is alert and oriented to person, place, and time.   Psychiatric:         Mood and Affect: Mood normal.         Behavior: Behavior normal.        This note was created with voice-recognition software and was not corrected for typographical or grammatical errors

## 2025-05-21 NOTE — PATIENT INSTRUCTIONS
Patient Discussion/Summary     Good to see you once again. You had a successful cystoscopy with urethral dilatation of 8 strictures. The strictures were quite dense and you wish to maintain a 4 month schedule.  Once again, we had a discussion regarding definitive treatment for your chronic urethral strictures.  You decided not to consider urethral reconstruction with Dr. Tc Campo.      This note was created with voice-recognition software and was not corrected for typographical or grammatical errors.

## 2025-05-21 NOTE — Clinical Note
"May 21, 2025     No Recipients    Patient: Armando Smith   YOB: 1960   Date of Visit: 5/21/2025       Dear Dr. Cleaning Recipients:    Thank you for referring Armando Smith to me for evaluation. Below are my notes for this consultation.  If you have questions, please do not hesitate to call me. I look forward to following your patient along with you.       Sincerely,     Mynor Anderson MD      CC: No Recipients  ______________________________________________________________________________________    Provider Impressions     64 year-old white male with his wife Marj. Originally presented with URINARY INCONTINENCE, GROSS HEMATURIA, URETHRAL STRICTURE, and BIPOLAR DISORDER. He was experiencing 3 UTIs per year. He receives mandy-intercourse ciprofloxacin. No family history of prostate cancer. Never smoked cigarettes.     2008 at [facility], a [place]kew CATHETER was placed. The patient claims that that \"started my problem\". He is now undergoing cystoscopy with urethral dilatation on a routine schedule.     10/01/12, PSA 0.5.     11/12/13, PSA 0.4, cystoscopy with urethral dilatation performed.     04/08/15 PSA 0.4, all urine testing negative. CYSTOSCOPY with URETHRAL DILATATION to 24 Yemeni. Patient states that 9 months was too long and that he was DRIBBLING and had poor stream over the last month. We will therefore change his schedule to 8 months and keep it at that range.     12/08/15, very significant DISTAL URETHRAL STRICTURE approximately 6 cm from the meatus. Measured and calibrated at 6 Yemeni. This was carefully dilated with bleeding. Urine studies were normal and PSA is 0.2. We will transform to a new schedule. He will have dilations every 6 months, March and September. Office visit in September with UA, C&S, and PSA.     03/19/16, successful cystoscopy with URETHRAL DILATATION. Multiple, 8-10, STRICTURES in the distal URETHRA DILATED to 24 Yemeni with bleeding. We will maintain a 6 month schedule.   "   10/05/16, successful CYSTOSCOPY with URETHRAL DILATATION of 9 separate distal URETHRAL STRICTURES. There was some bleeding and discomfort. Urinalysis and urine culture negative. PSA is 0.2. We will maintain a 6 month schedule.     03/08/17, successful CYSTOSCOPY with URETHRAL DILATATION of 6 separate distal URETHRAL STRICTURES. There was moderate discomfort. Patient had a recent UTI which was resistant to Cipro. Therefore we will switch his prophylactic antibiotic to Macrobid. He will return in 6 months.     09/12/17, successful CYSTOSCOPY with URETHRAL DILATATION of of 6 separate distal URETHRAL STRICTURES, beginning at the meatus, to the external sphincter. There was mild discomfort. We will continue on his mandy-intercourse Macrobid. He did not obtain laboratory testing and will return soon for that. He will maintain a 6 month dilation schedule.     10/10/17, patient returns for office visit only. PSA 0.3, urinalysis negative, urine culture mixed. His wife Marj is with him today. He will return on 03/13/18 for dilation.     03/13/18, successful CYSTOSCOPY with URETHRAL DILATATION of of 6 separate distal URETHRAL STRICTURES, beginning 2 cm from the meatus, to the external sphincter. There was mild discomfort. We will continue on his mandy-intercourse Macrobid. we will maintain a 6 month schedule. He will return in September and that will also be his yearly visit.     09/11/18, successful CYSTOSCOPY with URETHRAL DILATATION of 8 separate distal URETHRAL STRICTURES, beginning 2 cm from the meatus. These extend to the EXTERNAL SPHINCTER. They were extremely dense and very difficult to dilate today. There was moderate discomfort. We will transition to a 4 month schedule instead of 6. He will continue with mandy-intercourse Macrobid. Urinalysis and urine culture are normal. PSA is 1.86.     01/08/19, successful CYSTOSCOPY WITH URETHRAL DILATATION once again of 8 SEPARATE DISTAL URETHRAL STRICTURES beginning 2 cm from  the meatus and ending 4 cm from the external sphincter. Minimal pain and moderate bleeding. I believe the 4 month schedule is at the threshold and we will maintain this regimen.     05/08/19, successful CYSTOSCOPY WITH URETHRAL DILATATION of 8 separate distal u caps that s beginning 2 cm from the meatus and ending 2 cm from the EXTERNAL SPHINCTER. Minimal pain and moderate bleeding. Patient states that the 4 month schedule is dramatically better, adding that he only notices minimal discomfort with dilation and his stream is only mildly slower the last month. We will continue to maintain that regimen.     09/18/19, successful CYSTOSCOPY WITH URETHRAL DILATATION of 8 separate distal URETHRAL STRICTURES beginning 2 cm from the meatus and ending 2 cm from the external sphincter. Minimal pain and moderate bleeding. Patient states that the 4 month schedule is perfect. He is starting to notice decreased flow stream and the discomfort with dilatation is only minimal. We will maintain that regimen. Urinalysis shows no blood, urine culture no growth, and PSA 0.2.     01/08/20, successful CYSTOSCOPY WITH URETHRAL DILATATION OF 12 SEPARATE DISTAL URETHRAL STRICTURES beginning 2 cm from the meatus and ending 2 cm from the external sphincter to 24 South Sudanese. Minimal pain and minimal bleeding. He is very happy with the 4 month schedule. He also was noticed that he has not had a UTI for several years. We will maintain this regimen.     05/22/20, successful CYSTOSCOPY WITH URETHRAL DILATATION of 12 separate DISTAL URETHRAL STRICTURES beginning 2 cm from the meatus and ending 2 cm from the external sphincter to 24 [place]ese with minimal pain. He is very pleased with this 4 month schedule.     09/25/20, successful CYSTOSCOPY WITH URETHRAL DILATATION of 12 distinct DISTAL URETHRAL STRICTURES beginning at 2 cm from the meatus and ending 2 cm from the external sphincter, to 24 South Sudanese with minimal pain and no bleeding. Urinalysis and urine  culture are negative. PSA is 0.20. We will maintain a 4 month routine.     March 1, 2021, successful CYSTOSCOPY WITH URETHRAL DILATATION OF 10 DISTINCT DISTAL URETHRAL STRICTURES beginning 2 cm proximal to the meatus and ending 2 cm distal from the external sphincter. These were dilated to 24 Citizen of [place] with moderate pain and no bleeding. Patient is late for his routine dilation and the strictures were quite dense. We will have him return in May and then resume his 4-month schedule.     May 8, 2021, successful cystoscopy with urethral dilatation of 10 separate distal urethral strictures. The first beginning 2 cm proximal to the meatus and the last being 2 cm distal from the external sphincter. They were dilated to 24 Citizen of [place] with minimal pain and minimal bleeding. He will return in September and maintain a 4-month regimen.  He is not interested in definitive surgical repair.     September 3, 2021, successful cystoscopy with dilatation of 10 separate distal urethral strictures. The first is encountered 2 cm from the meatus and the last being 2 cm distal from the external sphincter. All dilated to 24 Citizen of [place] with minimal bleeding. Patient will return in January and maintain a 4-month regimen. Once again we discussed a definitive surgical repair which he refuses. Urinalysis is negative, urine culture no growth, PSA 0.20.     January 7, 2022, successful cystoscopy with dilatation of 10 separate distal urethral strictures beginning 2 cm from the meatus and ending 2 cm distal to the external sphincter. All dilated to 24 Citizen of [place] with moderate pain and minimal bleeding. Patient states that over the last 2 weeks he has been noticing a decreased flow of stream. He will return in May and we will maintain his 4-month schedule.     May 4, 2022, successful cystoscopy with dilatation of 9 separate distal urethral strictures beginning 2 cm from the meatus and ending 3 cm distal to the external sphincter. They were all dilated to 24 Citizen of [place]  with minimal pain and minimal bleeding. He has been straining to urinate over the last week. We will maintain his 4-month schedule and see him again in September September 12, 2022, cystoscopy with dilatation of 9 separate distal urethral strictures beginning 4 cm from the meatus and ending 2 cm from the external sphincter. Quite dense today. Dilated to 24 Hebrew. Minimal pain and moderate bleeding. The bladder itself appears normal. Urine analysis was negative for blood. Urine culture no growth. PSA 0.21. Patient believed by symptoms that he had a UTI, no culture given. He self treated with on hand Macrobid with good results. We will maintain his 4-month schedule.     January 3, 2023, cystoscopy with dilatation of 8 separate distal urethral strictures beginning 2 cm from the meatus and ending 4 cm from the external sphincter. These were dilated to 24 Hebrew with minimal bleeding and minimal discomfort. Moderately obstructed prostatic urethra with a trabeculated bladder. We will maintain his 4-month schedule.     April 28, 2023, cystoscopy with dilatation of 8 separate distal urethral strictures beginning 2 cm from the meatus with the last stricture being 4 cm from the external sphincter. Dilated to 24 Hebrew with moderate discomfort and no bleeding. Moderately obstructed prostatic urethra and a trabeculated bladder with debris. We will maintain the 4-month schedule.     September 11, 2023, cystoscopy with dilatation of 8 separate distal urethral strictures. These begin 2 cm from the meatus and and 4 cm from the external sphincter. Dilation to 24 Hebrew with minimal discomfort and minimal bleeding. Moderately obstructed prostatic urethra. Trabeculated bladder with minimal debris. He wishes to maintain his 4-month schedule. Urinalysis is negative for blood. Urine culture no growth. PSA 0.20.     January 8, 2024, cystoscopy with dilatation of 8 separate distal urethral strictures. These begin 2 cm from the meatus  and end 4 cm from the external sphincter. Dilation to 24 French with minimal discomfort and minimal bleeding. Moderately obstructed prostatic urethra. Trabeculated bladder with minimal debris. He wishes to maintain his 4-month schedule.     May 20, 2024, cystoscopy with dilatation of 8 separate, distal urethral strictures.  The first is 2 cm from the meatus and the last which was quite dense was 4 cm from the external sphincter.  All dilated to 24 French with minimal bleeding and minimal discomfort.  Moderately obstructed prostatic urethra.  He will maintain his 4-month schedule.     September 2024, cystoscopy with dilatation canceled due to patient's severe poison ivy     October 18, 2024, cystoscopy with dilatation again of 8 separate distal urethral strictures.  These began 2 cm from the meatus and the last dense stricture is 4 cm distal from the external sphincter.  Dilated to 24 French with minimal bleeding and minimal pain.  Moderately obstructed prostatic urethra.  PSA 0.26.  Urine culture no growth.  Urinalysis is negative for blood.  Once again I have encouraged him to proceed definitive therapy.  He would be willing to see Dr. Tc Campo for evaluation of urethral reconstruction.  Otherwise I will see him again in January.     December 2024, patient cancels his referral with Dr. Tc Campo.     January 21, 2025, cystoscopy with dilatation of 8 separate distal urethral strictures beginning 2 cm from the meatus and ending 4 cm distal from the external sphincter.  Minimal discomfort with no bleeding.  Dilated to 24 French.  Moderately obstructed prostatic urethra and normal-appearing bladder.  Patient wishes to maintain his 4-month schedule and is not interested in formal evaluation of urethral reconstruction.    May 21, 2025, cystoscopy with dilatation of 8 distinct urethral distal strictures beginning 2 cm from the meatus and ending 4 cm distal from the external sphincter.  Minimal discomfort with  no bleeding.  All dilated to 24 Norwegian.  PVR 66 cc.  Moderately obstructed prostatic urethra.  Normal-appearing bladder without any stones or tumors.  Patient wishes to maintain his 4-month schedule.  He has canceled several appointments to meet with other urologist regarding urethral reconstruction.     PLAN:     #1 the patient will return in January, May and September for cystoscopy with urethral dilatation to 24 Norwegian. MACROBID 100 mg PROPHYLAXIS, Begin 3 days PRIOR. We will maintain a 4 month schedule,     #2 UA, C&S, PSA and office visit in September only. MAINTAIN a 4 month schedule.     #3 Macrobid 100 mg by mouth every 12 hours mandy-intercourse when necessary.     Physical Exam  Constitutional:       Appearance: Normal appearance.   HENT:      Head: Normocephalic and atraumatic.   Pulmonary:      Effort: Pulmonary effort is normal.   Abdominal:      Palpations: Abdomen is soft.      Tenderness: There is no abdominal tenderness.   Genitourinary:     Penis: Normal.       Testes: Normal.   Musculoskeletal:         General: Normal range of motion.      Cervical back: Normal range of motion and neck supple.   Neurological:      General: No focal deficit present.      Mental Status: He is alert and oriented to person, place, and time.   Psychiatric:         Mood and Affect: Mood normal.         Behavior: Behavior normal.        This note was created with voice-recognition software and was not corrected for typographical or grammatical errors    Patient ID: Armando Smith is a 64 y.o. male.    Procedures  Pt took Macrobid 100mg po 3 days prior  Anesthesia: Local 2% Lidocaine  Instruments: 6F flexible disposable Cystoscope     Pt brought to procedure room and placed in dorsal lithotomy position. Pt draped and prepped in normal sterile fashion. 5ml lidocaine instilled into urethral meatus and 5ml instilled into urethra (penis). Pt tolerated well.     I was present as chaperone for the entirety of the procedure    "OMEGA  Cystoscopy performed by Dr. Mynor Anderson        Bedside \"Time Out\" Verification   Today's Date:  05/21/2025 I attest that this time out verification took place prior to the procedure.   Procedure: Cysto/dil  RN/LPN/MA: OMEGA   Provider: [facility].   Verified By: OMEGA SLAUGHTER and Provider, Dr. Mynor Anderson.   Prior to the start of the procedure a time out was taken and the following were verified: the identity of the patient using two patient identifiers, the correct procedure, the correct site marked as indicated, the correct positioning for the patient and the correct equipment was obtained.   Cystoscopy - Armando Luis-identified using two (2) forms of identification.   Procedure: diagnostic Cystourethroscopy  Procedure Note: Time Started: 9:30am     Time Completed: 9:54 AM  Indications for procedure: Cysto with Dilatation 24F for urethral stricture.          Discussed with patient: Risks, benefits, and alternative were discussed in detail. Patient appears to understand and agrees to proceed. Patient has signed the procedure consent form.    CYSTOSCOPY:    Cystoscopy today reveals a distal urethral strictures.  The first 2 are within 4 cm of the meatus.  The remainder are closer to the external sphincter.  All dilated to 24 Kazakh with minimal discomfort and no bleeding.  PVR 66 cc.  Bladder appears normal without any evidence of stones or tumors.  "

## 2025-08-01 DIAGNOSIS — N39.0 URINARY TRACT INFECTION WITHOUT HEMATURIA, SITE UNSPECIFIED: ICD-10-CM

## 2025-08-01 DIAGNOSIS — R31.0 GROSS HEMATURIA: ICD-10-CM

## 2025-08-19 LAB
APPEARANCE UR: CLEAR
BACTERIA UR CULT: NORMAL
BILIRUB UR QL STRIP: NEGATIVE
COLOR UR: YELLOW
GLUCOSE UR QL STRIP: NEGATIVE
HGB UR QL STRIP: NEGATIVE
KETONES UR QL STRIP: NEGATIVE
LEUKOCYTE ESTERASE UR QL STRIP: NEGATIVE
NITRITE UR QL STRIP: NEGATIVE
PH UR STRIP: 5.5 [PH] (ref 5–8)
PROT UR QL STRIP: NEGATIVE
PSA SERPL-MCNC: 0.31 NG/ML
SP GR UR STRIP: 1.02 (ref 1–1.03)

## 2025-09-23 ENCOUNTER — APPOINTMENT (OUTPATIENT)
Dept: UROLOGY | Facility: CLINIC | Age: 65
End: 2025-09-23
Payer: MEDICARE